# Patient Record
Sex: FEMALE | Race: WHITE | NOT HISPANIC OR LATINO | Employment: FULL TIME | URBAN - METROPOLITAN AREA
[De-identification: names, ages, dates, MRNs, and addresses within clinical notes are randomized per-mention and may not be internally consistent; named-entity substitution may affect disease eponyms.]

---

## 2023-08-10 ENCOUNTER — OFFICE VISIT (OUTPATIENT)
Dept: FAMILY MEDICINE CLINIC | Facility: CLINIC | Age: 58
End: 2023-08-10
Payer: COMMERCIAL

## 2023-08-10 VITALS
RESPIRATION RATE: 18 BRPM | HEART RATE: 78 BPM | WEIGHT: 191 LBS | DIASTOLIC BLOOD PRESSURE: 72 MMHG | TEMPERATURE: 97.8 F | SYSTOLIC BLOOD PRESSURE: 110 MMHG | BODY MASS INDEX: 27.35 KG/M2 | HEIGHT: 70 IN

## 2023-08-10 DIAGNOSIS — E55.9 VITAMIN D INSUFFICIENCY: ICD-10-CM

## 2023-08-10 DIAGNOSIS — E03.9 ACQUIRED HYPOTHYROIDISM: ICD-10-CM

## 2023-08-10 DIAGNOSIS — M79.622 LEFT UPPER ARM PAIN: ICD-10-CM

## 2023-08-10 DIAGNOSIS — R53.82 CHRONIC FATIGUE: ICD-10-CM

## 2023-08-10 DIAGNOSIS — Z00.00 ENCOUNTER FOR WELL ADULT EXAM WITHOUT ABNORMAL FINDINGS: Primary | ICD-10-CM

## 2023-08-10 DIAGNOSIS — Z13.6 ENCOUNTER FOR LIPID SCREENING FOR CARDIOVASCULAR DISEASE: ICD-10-CM

## 2023-08-10 DIAGNOSIS — Z11.59 NEED FOR HEPATITIS C SCREENING TEST: ICD-10-CM

## 2023-08-10 DIAGNOSIS — Z12.11 SCREENING FOR MALIGNANT NEOPLASM OF COLON: ICD-10-CM

## 2023-08-10 DIAGNOSIS — M79.604 RIGHT LEG PAIN: ICD-10-CM

## 2023-08-10 DIAGNOSIS — Z13.220 ENCOUNTER FOR LIPID SCREENING FOR CARDIOVASCULAR DISEASE: ICD-10-CM

## 2023-08-10 DIAGNOSIS — I83.813 VARICOSE VEINS OF BOTH LOWER EXTREMITIES WITH PAIN: ICD-10-CM

## 2023-08-10 RX ORDER — MULTIPLE VITAMINS W/ MINERALS TAB 9MG-400MCG
1 TAB ORAL DAILY
COMMUNITY

## 2023-08-10 NOTE — PROGRESS NOTES
Subjective   Rashmi Wan is a 58 y.o. female.     History of Present Illness     HPI  The patient presents today to [facility] care and for annual exam . The patient has not seen a primary care physician since 2019. Her previous primary care physician was Dr. Ghotra but he retired.    The patient has varicose veins. She has very little swelling except down by the ankles. The patient also experiences pain in the lateral aspect of the right thigh.    The patient has a back injury. She fell down a flight of steps 20 years ago and had 2 herniated discs. She sees a chiropractor, Dr. Meek, once a month for this who performs decompression on her back, which has helped up until this last year.     Occasionally, the patient feels like her right lower extremity is going to give out. She also has pain in her left upper extremity, especially when she lifts it up. Sometimes she cannot tell if the pain is from her varicose veins or the lower extremity itself. The patient's knee is slightly puffy. The patient fell on an escalator when 2 people came up behind her and they tripped and fell into her. She has a slight tear in the meniscus.    The patient has pain in the location of the right rib cage. She feels sharp pains when she bends over to tie her shoes.    The patient has hypothyroidism for which she was put on Synthroid. They kept having to increase the dose, and then she started to gain weight. She took Synthroid for about 2 years. She had a biopsy performed approximately 15 years ago in [place] when she had a thyroid nodule, and no malignancy was found.    The patient eats organic food and a gluten free diet. The patient tries to go to the gym as much as she can, but the pain in her right lower extremity slows her down.    The patient is concerned about her hormones. The patient went through menopause at age 54. She had COVID-19 in 04/2020, and her menstruation just stopped at that time. The patient had a  "transvaginal ultrasound a few years ago, and everything was normal. There were no fibroids, but she has had cysts in the past. She denies ever being on hormone replacement therapy.    The patient had a mammogram when she was 38 years old, and she does not want to have any more, as she is concerned about the exposure to the radiation. She has had a Colguard test with Dr. Ghotra when she was 50 or 51 years old which was normal.    From 10/2015 to 02/2015, the patient was living in an . There was a high amount of mold, and she is highly allergic to mold. She was continually coming down with sinus infections and upper respiratory infections. At that time, she went through 4 doses of antibiotics and steroids which resulted in a bleeding ulcer in her stomach which resulted in anemia.    The patient's brother had Hodgkin's disease at age 12. He is 52 years old now. She denies a family history of autoimmune issues.    The patient works at a desk job.    The following portions of the patient's history were reviewed and updated as appropriate: allergies, current medications, past family history, past medical history, past social history, past surgical history, and problem list.        Objective   Blood pressure 110/72, pulse 78, temperature 97.8 øF (36.6 øC), resp. rate 18, height 176.5 cm (69.5\"), weight 86.6 kg (191 lb).     Physical Exam  Vitals and nursing note reviewed.   Constitutional:       Appearance: Normal appearance.   HENT:      Head: Normocephalic.      Right Ear: Tympanic membrane, ear canal and external ear normal.      Left Ear: Tympanic membrane, ear canal and external ear normal.      Nose: Nose normal.   Cardiovascular:      Rate and Rhythm: Normal rate and regular rhythm.      Comments: Varicose veins of LE bilaterally   Pulmonary:      Effort: Pulmonary effort is normal.      Breath sounds: Normal breath sounds.   Skin:     General: Skin is warm and dry.   Neurological:      Mental Status: She is " alert and oriented to person, place, and time.   Psychiatric:         Mood and Affect: Mood normal.         Behavior: Behavior normal.       Assessment & Plan   Diagnoses and all orders for this visit:    1. Encounter for well adult exam without abnormal findings (Primary)  -     CBC w AUTO Differential  -     Comprehensive metabolic panel  -     Lipid Panel  -     TSH  -     T4, free  -     T3, free  -     Thyroid Antibodies  -     Hepatitis C antibody  -     Iron Profile  -     Vitamin B12  -     Folate  -     Vitamin D 25 hydroxy  -     FSH & LH  -     Progesterone  -     Estrogens, Total  -     MARICARMEN  -     Rheumatoid Factor  -     Sedimentation rate, automated  -     C-reactive protein  -     CK  -     Magnesium  -     Ambulatory Referral to Gynecology    2. Screening for malignant neoplasm of colon  -     Cologuard - Stool, Per Rectum; Future    3. Varicose veins of both lower extremities with pain  -     Ambulatory Referral to Vascular Surgery    4. Right leg pain  -     CBC w AUTO Differential  -     Comprehensive metabolic panel  -     MARICARMEN  -     Rheumatoid Factor  -     Sedimentation rate, automated  -     C-reactive protein  -     CK  -     Magnesium  -     Ambulatory Referral to Orthopedic Surgery    5. Left upper arm pain  -     MARICARMEN  -     Rheumatoid Factor  -     Sedimentation rate, automated  -     C-reactive protein  -     CK  -     Magnesium  -     Ambulatory Referral to Orthopedic Surgery    6. Vitamin D insufficiency  -     Vitamin D 25 hydroxy    7. Need for hepatitis C screening test  -     Hepatitis C antibody    8. Acquired hypothyroidism  -     CBC w AUTO Differential  -     Comprehensive metabolic panel  -     TSH  -     T4, free  -     T3, free  -     Thyroid Antibodies    9. Chronic fatigue  -     Iron Profile  -     Vitamin B12  -     Folate  -     MARICARMEN    10. Encounter for lipid screening for cardiovascular disease  -     Lipid Panel       Varicose veins  - I advised the patient to obtain  compression socks.  - I will refer the patient to a vascular specialist.    Right lower extremity and left upper extremity pain  - I will refer her to an orthopedist and check labs as noted      Health maintenance  - I will order a Cologuard kit for the patient.  - I will refer her to an OB/GYN.  - I will order labs.  - The patient declines any vaccinations.    Counseling was given to patient for the following topics: instructions for management, risk factor reductions, patient and family education, and impressions . Total time of the encounter was 30 minutes and 15 minutes was spent counseling.      Transcribed from ambient dictation for GIOVANNI Gamble by Salma Marie.  08/10/23   17:55 EDT    Patient or patient representative verbalized consent to the visit recording.  I have personally performed the services described in this document as transcribed by the above individual, and it is both accurate and complete.

## 2023-08-15 ENCOUNTER — TELEPHONE (OUTPATIENT)
Dept: FAMILY MEDICINE CLINIC | Facility: CLINIC | Age: 58
End: 2023-08-15
Payer: COMMERCIAL

## 2023-08-15 NOTE — TELEPHONE ENCOUNTER
"Caller: Rashmi Torres \"Patria\"    Relationship: Self    Best call back number:  [phone]    What orders are you requesting (i.e. lab or imaging): VENUS DUPLEX TEST    In what timeframe would the patient need to come in: ASAP    Where will you receive your lab/imaging services: [facility]    Additional notes: PATIENT STATES THAT SHE NEEDS THE TEST DONE BEFORE SHE CAN BE REFERRED        "

## 2023-08-16 ENCOUNTER — PATIENT ROUNDING (BHMG ONLY) (OUTPATIENT)
Dept: FAMILY MEDICINE CLINIC | Facility: CLINIC | Age: 58
End: 2023-08-16
Payer: COMMERCIAL

## 2023-08-16 NOTE — PROGRESS NOTES
A My-Chart message has been sent to the patient for PATIENT ROUNDING with [facility].

## 2023-08-17 ENCOUNTER — TELEPHONE (OUTPATIENT)
Dept: FAMILY MEDICINE CLINIC | Facility: CLINIC | Age: 58
End: 2023-08-17

## 2023-08-17 ENCOUNTER — TELEPHONE (OUTPATIENT)
Dept: FAMILY MEDICINE CLINIC | Facility: CLINIC | Age: 58
End: 2023-08-17
Payer: COMMERCIAL

## 2023-08-17 LAB
25(OH)D3+25(OH)D2 SERPL-MCNC: 34 NG/ML (ref 30–100)
ALBUMIN SERPL-MCNC: 4.6 G/DL (ref 3.8–4.9)
ALBUMIN/GLOB SERPL: 1.8 {RATIO} (ref 1.2–2.2)
ALP SERPL-CCNC: 62 IU/L (ref 44–121)
ALT SERPL-CCNC: 13 IU/L (ref 0–32)
ANA SER QL: NEGATIVE
AST SERPL-CCNC: 17 IU/L (ref 0–40)
BASOPHILS # BLD AUTO: 0 X10E3/UL (ref 0–0.2)
BASOPHILS NFR BLD AUTO: 1 %
BILIRUB SERPL-MCNC: 0.6 MG/DL (ref 0–1.2)
BUN SERPL-MCNC: 11 MG/DL (ref 8–27)
BUN/CREAT SERPL: 17 (ref 12–28)
CALCIUM SERPL-MCNC: 9.6 MG/DL (ref 8.7–10.3)
CHLORIDE SERPL-SCNC: 103 MMOL/L (ref 96–106)
CHOLEST SERPL-MCNC: 236 MG/DL (ref 100–199)
CK SERPL-CCNC: 82 U/L (ref 32–182)
CO2 SERPL-SCNC: 24 MMOL/L (ref 20–29)
CREAT SERPL-MCNC: 0.66 MG/DL (ref 0.57–1)
CRP SERPL-MCNC: <1 MG/L (ref 0–10)
EGFRCR SERPLBLD CKD-EPI 2021: 100 ML/MIN/1.73
EOSINOPHIL # BLD AUTO: 0.1 X10E3/UL (ref 0–0.4)
EOSINOPHIL NFR BLD AUTO: 1 %
ERYTHROCYTE [DISTWIDTH] IN BLOOD BY AUTOMATED COUNT: 13 % (ref 11.7–15.4)
ERYTHROCYTE [SEDIMENTATION RATE] IN BLOOD BY WESTERGREN METHOD: 8 MM/HR (ref 0–40)
ESTROGEN SERPL-MCNC: 52 PG/ML (ref 40–244)
FOLATE SERPL-MCNC: 8.9 NG/ML
FSH SERPL-ACNC: 85.8 MIU/ML
GLOBULIN SER CALC-MCNC: 2.5 G/DL (ref 1.5–4.5)
GLUCOSE SERPL-MCNC: 95 MG/DL (ref 70–99)
HCT VFR BLD AUTO: 42.9 % (ref 34–46.6)
HCV IGG SERPL QL IA: NON REACTIVE
HDLC SERPL-MCNC: 74 MG/DL
HGB BLD-MCNC: 14 G/DL (ref 11.1–15.9)
IMM GRANULOCYTES # BLD AUTO: 0 X10E3/UL (ref 0–0.1)
IMM GRANULOCYTES NFR BLD AUTO: 0 %
IRON SATN MFR SERPL: 29 % (ref 15–55)
IRON SERPL-MCNC: 92 UG/DL (ref 27–159)
LDLC SERPL CALC-MCNC: 143 MG/DL (ref 0–99)
LH SERPL-ACNC: 49 MIU/ML
LYMPHOCYTES # BLD AUTO: 2 X10E3/UL (ref 0.7–3.1)
LYMPHOCYTES NFR BLD AUTO: 30 %
MAGNESIUM SERPL-MCNC: 2.3 MG/DL (ref 1.6–2.3)
MCH RBC QN AUTO: 29.3 PG (ref 26.6–33)
MCHC RBC AUTO-ENTMCNC: 32.6 G/DL (ref 31.5–35.7)
MCV RBC AUTO: 90 FL (ref 79–97)
MONOCYTES # BLD AUTO: 0.6 X10E3/UL (ref 0.1–0.9)
MONOCYTES NFR BLD AUTO: 9 %
NEUTROPHILS # BLD AUTO: 3.9 X10E3/UL (ref 1.4–7)
NEUTROPHILS NFR BLD AUTO: 59 %
PLATELET # BLD AUTO: 279 X10E3/UL (ref 150–450)
POTASSIUM SERPL-SCNC: 3.9 MMOL/L (ref 3.5–5.2)
PROGEST SERPL-MCNC: 0.1 NG/ML
PROT SERPL-MCNC: 7.1 G/DL (ref 6–8.5)
RBC # BLD AUTO: 4.78 X10E6/UL (ref 3.77–5.28)
RHEUMATOID FACT SERPL-ACNC: <10 IU/ML
SODIUM SERPL-SCNC: 141 MMOL/L (ref 134–144)
T3FREE SERPL-MCNC: 3.3 PG/ML (ref 2–4.4)
T4 FREE SERPL-MCNC: 1 NG/DL (ref 0.82–1.77)
THYROGLOB AB SERPL-ACNC: 1 IU/ML (ref 0–0.9)
THYROPEROXIDASE AB SERPL-ACNC: 110 IU/ML (ref 0–34)
TIBC SERPL-MCNC: 320 UG/DL (ref 250–450)
TRIGL SERPL-MCNC: 109 MG/DL (ref 0–149)
TSH SERPL DL<=0.005 MIU/L-ACNC: 3.04 UIU/ML (ref 0.45–4.5)
UIBC SERPL-MCNC: 228 UG/DL (ref 131–425)
VIT B12 SERPL-MCNC: 956 PG/ML (ref 232–1245)
VLDLC SERPL CALC-MCNC: 19 MG/DL (ref 5–40)
WBC # BLD AUTO: 6.6 X10E3/UL (ref 3.4–10.8)

## 2023-08-17 NOTE — TELEPHONE ENCOUNTER
"Caller: Rashmi Torres \"Patria\"    Relationship to patient: Self    Best call back number: [phone]     PATIENT IS CALLING STATING THAT SHE IS TO HAVE AN VEIN TEST SCHEDULED, BUT SHE HAS NOT RECEIVE ANY INFORMATION.  "

## 2023-08-17 NOTE — TELEPHONE ENCOUNTER
Pt advised referral was processed 8/15/2023 and may take a few days before someone will call with a date and time for the appt

## 2023-08-17 NOTE — TELEPHONE ENCOUNTER
"  Caller: Rashmi Torres \"Patria\"    Relationship: Self    Best call back number: [phone]     Caller requesting test results: PATIENT    What test was performed: LABS    When was the test performed: LAST THURSDAY    Where was the test performed: [facility]  "

## 2023-08-22 ENCOUNTER — TELEPHONE (OUTPATIENT)
Dept: FAMILY MEDICINE CLINIC | Facility: CLINIC | Age: 58
End: 2023-08-22
Payer: COMMERCIAL

## 2023-08-24 ENCOUNTER — TELEPHONE (OUTPATIENT)
Dept: FAMILY MEDICINE CLINIC | Facility: CLINIC | Age: 58
End: 2023-08-24
Payer: COMMERCIAL

## 2023-08-24 NOTE — TELEPHONE ENCOUNTER
Pt believes it's bed bugs. Lenny has left for the day and isn't in the office tmrw. So, I suggested OTC products, scheduling appt, going to [facility] etc. Declined appt due to copay charge. She did express her concerns to the Hotel in [place]. The  is suppose to get back w/ her on their findings.

## 2023-09-06 ENCOUNTER — TELEPHONE (OUTPATIENT)
Dept: FAMILY MEDICINE CLINIC | Facility: CLINIC | Age: 58
End: 2023-09-06
Payer: COMMERCIAL

## 2023-09-06 NOTE — TELEPHONE ENCOUNTER
CALLED [facility] THEY ARE WORKING ON ORDERS - I WENT AHEAD AN SENT ORDER TO Jew TO GET APPOINTMENT SOONER - I CALLED JESSICA TO MAKE AWARE

## 2023-09-06 NOTE — TELEPHONE ENCOUNTER
PATIENT HAS NOT HEARD ANYTHING ABOUT SCHEDULING VENOUS DUPLEX.  WHAT IS GOING ON?    PLEASE CALL [phone]

## 2023-09-12 ENCOUNTER — OFFICE VISIT (OUTPATIENT)
Dept: ORTHOPEDIC SURGERY | Facility: CLINIC | Age: 58
End: 2023-09-12
Payer: COMMERCIAL

## 2023-09-12 VITALS
BODY MASS INDEX: 28.29 KG/M2 | HEIGHT: 69 IN | DIASTOLIC BLOOD PRESSURE: 74 MMHG | SYSTOLIC BLOOD PRESSURE: 106 MMHG | WEIGHT: 191 LBS

## 2023-09-12 DIAGNOSIS — M76.31 IT BAND SYNDROME, RIGHT: ICD-10-CM

## 2023-09-12 DIAGNOSIS — M76.891 ADDUCTOR TENDINITIS OF RIGHT HIP: ICD-10-CM

## 2023-09-12 DIAGNOSIS — M17.11 PRIMARY OSTEOARTHRITIS OF RIGHT KNEE: ICD-10-CM

## 2023-09-12 DIAGNOSIS — M25.561 RIGHT KNEE PAIN, UNSPECIFIED CHRONICITY: Primary | ICD-10-CM

## 2023-09-12 NOTE — PROGRESS NOTES
[facility] [place]     Office Visit       Date: 09/12/2023   Patient Name: Rashmi Wan  MRN: [id number]  YOB: 1965    Referring Physician: Lenny Antonio PA     Chief Complaint:   Chief Complaint   Patient presents with    Right Leg - Pain, Initial Evaluation     Onset 2018-Lateral Distal Thigh/Anterior Thigh     History of Present Illness:   Rashmi Wan is a 58 y.o. female who presents with right knee/thigh pain for 5 year(s). Onset atraumatic and gradual in nature. Pain is localized to the lateral joint line, anterior knee, anterior thigh and is a 7/10 on the pain scale. Pain is described as  Cramp . Associated symptoms include giving way/buckling. The pain is worse with walking, climbing stairs, and rising from seated position; pain medication and/or NSAID and TENS, Topical Analgesic Cream makes it better. Previous treatments have included: Chiropractic treatment, TENS, OTC Pain-Topical Pain Cream since symptom onset. Although some transient relief was reported with these interventions, these conservative measures have failed and symptoms have persisted. The patient is limited in daily activities and has had a significant decrease in quality of life as a result. She denies fevers, chills, or constitutional symptoms. Patient is not a smoker.    Subjective   Review of Systems: Review of Systems   Constitutional:  Negative for chills, fever, unexpected weight gain and unexpected weight loss.   HENT:  Negative for congestion, postnasal drip and rhinorrhea.    Eyes:  Negative for blurred vision.   Respiratory:  Negative for shortness of breath.    Cardiovascular:  Negative for leg swelling.   Gastrointestinal:  Negative for abdominal pain, nausea and vomiting.   Genitourinary:  Negative for difficulty urinating.   Musculoskeletal:  Positive for arthralgias. Negative for gait problem, joint swelling and myalgias.   Skin:   Negative for skin lesions and wound.   Neurological:  Negative for dizziness, weakness, light-headedness and numbness.   Hematological:  Does not bruise/bleed easily.   Psychiatric/Behavioral:  Negative for depressed mood.    All other systems reviewed and are negative.     I have reviewed the following portions of the patient's history:History of Present Illness and review of systems.    Past Medical History:   Past Medical History:   Diagnosis Date    Allergic     Dust, Mold, Grasses    Anemia 2016    From steriod/antibiotics causing bleeding lesions in my stomach    Ankle sprain ,     Pulled ligaments    Bursitis of hip     Headache     Related to sinuses    Hypothyroidism     Take Thyriod Assist supplement    Low back pain     Fell down steps    Low back strain     Fell down commercial steps    Lumbosacral disc disease 2016    Two herniated discs L5, L6    Visual impairment 2018    See fuzzy lines, blurry       Past Surgical History: No past surgical history on file.    Family History:   Family History   Problem Relation Age of Onset    Cancer Mother         Colon Cancer - due to not being treated properly during -19    Thyroid disease Mother     Anesthesia problems Mother         Took one week to recover    Heart disease Father         Stents in heart    Cancer Brother         Hodgkin's Disease    Thyroid disease Brother         Due to radiation treatment    Anesthesia problems Paternal Grandmother         Didn’t recover;        Social History:   Social History     Socioeconomic History    Marital status:    Tobacco Use    Smoking status: Never     Passive exposure: Never    Smokeless tobacco: Never   Substance and Sexual Activity    Alcohol use: Yes     Alcohol/week: 3.0 standard drinks     Types: 3 Drinks containing 0.5 oz of alcohol per week     Comment: Social    Drug use: Never    Sexual activity: Yes     Partners: Male     Birth control/protection: Other, None  "    Comment: Menopause       Medications:   Current Outpatient Medications:     multivitamin with minerals tablet tablet, Take 1 tablet by mouth Daily., Disp: , Rfl:     NON FORMULARY, Thyroid Assist, Disp: , Rfl:     Allergies:   Allergies   Allergen Reactions    Other Other (See Comments)     Steroids cause bleeding lesions in stomach per patient     Augmentin [Amoxicillin-Pot Clavulanate] Other (See Comments)     Colitis        I reviewed the patient's chief complaint, history of present illness, review of systems, past medical history, surgical history, family history, social history, medications and allergy list.     Objective    Vital Signs:   Vitals:    09/12/23 1136   BP: 106/74   Weight: 86.6 kg (191 lb)   Height: 176.5 cm (69.49\")     Body mass index is 27.81 kg/m².   BMI is >= 25 and <30. (Overweight) The following options were offered after discussion;: exercise counseling/recommendations and nutrition counseling/recommendations     Patient reports that she is a non-smoker and has not ever been a smoker.  This behavior was applauded and she was encouraged to continue in smoking cessation.  We will continue to monitor at subsequent visits.    Ortho Exam:  Right knee: No erythema, ecchymosis, swelling.  Tender to palpation over the lateral knee and through the more proximal IT band.  Tender through the distal adductors.  Full range of motion in flexion extension but pain is experienced with deep knee flexion.  She can get to 0 degrees extension, 130 degrees in flexion.  Stable to varus and valgus stress.  Negative anterior posterior drawer.  Negative Pepper's.  Sensation intact to light touch.  5/5 strength.  2+ posterior tibial pulse.  Positive Vickie test.    Results Review:   Imaging Results (Last 24 Hours)       Procedure Component Value Units Date/Time    XR Knee 4+ View Right [[id number]] Resulted: 09/12/23 1139     Updated: 09/12/23 1152        I personally reviewed the radiographs of the right knee. "  No acute fracture or dislocation.  There is evidence of degenerative changes including tricompartmental space narrowing.    Procedures    Assessment / Plan    Assessment/Plan:   Diagnoses and all orders for this visit:    1. Right knee pain, unspecified chronicity (Primary)  -     XR Knee 4+ View Right    2. Primary osteoarthritis of right knee    3. It band syndrome, right  -     Ambulatory Referral to Physical Therapy Evaluate and treat, Ortho; Electrotherapy; Tens (Home), Iontophoresis, E-stim; Cross Fiber, Soft Tissue Mobilizaton, Desensitization; Stretching, ROM, Strengthening    4. Adductor tendinitis of right hip  -     Ambulatory Referral to Physical Therapy Evaluate and treat, Ortho; Electrotherapy; Tens (Home), Iontophoresis, E-stim; Cross Fiber, Soft Tissue Mobilizaton, Desensitization; Stretching, ROM, Strengthening      Multiple years of worsening low back and right lateral hip pain.  She has previously MRI confirmed herniated disks at multiple levels in her low back.  She has been through chiropractic care for this.  Now, she is having more significant pain and cramping through both the lateral and medial thigh.  She is tender through the IT band and has a positive Vickie test on exam.  She also has pain through the adductors.  She has been taking topical medications, Bentley back and body as well as using a TENS unit.  Radiographs of her knee today show degenerative changes consistent with primary knee osteoarthritis in all joint compartments.  However, her presenting complaint is more consistent with IT band syndrome and adductor tendinitis.  I recommend that she continue with her topicals as well as the TENS unit.  She would most benefit from physical therapy and I arranged for this today.  If she does not make any significant improvement with these modalities, I would look more aggressively at her lumbar spine given her history.  I will see her back in 5 weeks to make this determination.    Previous  laboratory results reviewed: 8/10/2023-CRP less than 1.  ESR 8.  Rheumatoid factor less than 10.0.  MARICARMEN negative.    Follow Up:   Return in about 5 weeks (around 10/17/2023) for Recheck.      Reece Mckeon MD  [facility]

## 2023-10-25 ENCOUNTER — OFFICE VISIT (OUTPATIENT)
Dept: FAMILY MEDICINE CLINIC | Facility: CLINIC | Age: 58
End: 2023-10-25
Payer: COMMERCIAL

## 2023-10-25 ENCOUNTER — HOSPITAL ENCOUNTER (OUTPATIENT)
Dept: GENERAL RADIOLOGY | Facility: HOSPITAL | Age: 58
Discharge: HOME | End: 2023-10-25
Admitting: NURSE PRACTITIONER
Payer: COMMERCIAL

## 2023-10-25 VITALS
BODY MASS INDEX: 28.14 KG/M2 | RESPIRATION RATE: 14 BRPM | HEIGHT: 69 IN | OXYGEN SATURATION: 98 % | TEMPERATURE: 97.8 F | SYSTOLIC BLOOD PRESSURE: 116 MMHG | WEIGHT: 190 LBS | HEART RATE: 80 BPM | DIASTOLIC BLOOD PRESSURE: 72 MMHG
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
## 2023-10-25 DIAGNOSIS — R51.9 OCCIPITAL PAIN: ICD-10-CM
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
## 2023-10-25 PROCEDURE — 70250 X-RAY EXAM OF SKULL: CPT

## 2023-10-25 NOTE — PROGRESS NOTES
Date: 10/25/2023   Patient Name: Rashmi Buck  : 1965   MRN: [id number]     Chief Complaint:    Chief Complaint   Patient presents with    Fell-hit head     Hit back of head on Sat. Very sore.        History of Present Illness: Rashmi Buck is a 58 y.o. female who is here today for Fall.  Patient reports that her friend went to give her a hug by and tripped and postero from [facility] and she fell and hit her head on the foot wrist.  She hit the occipital region of her skull.  Denies any loss of consciousness, headache, visual disturbances, nausea, vomiting.  She does report that when it happened she felt an indentation but now it is just tender to touch.  She has been sleeping well.  She did not go to the ER at the time.      Review of Systems:   Review of Systems   Constitutional:  Negative for activity change and fatigue.   Respiratory:  Negative for shortness of breath.    Cardiovascular:  Negative for chest pain.   Gastrointestinal:  Negative for nausea and vomiting.   Genitourinary:  Negative for difficulty urinating.   Musculoskeletal:  Negative for back pain and gait problem.   Skin:  Positive for bruise (Occipital region of skull).       Past Medical History:   Past Medical History:   Diagnosis Date    Allergic     Dust, Mold, Grasses    Anemia 2016    From steriod/antibiotics causing bleeding lesions in my stomach    Ankle sprain , 2015    Pulled ligaments    Bursitis of hip 2018    Headache     Related to sinuses    Hypothyroidism     Take Thyriod Assist supplement    Low back pain     Fell down steps    Low back strain     Fell down commercial steps    Lumbosacral disc disease 2016    Two herniated discs L5, L6    Visual impairment 2018    See fuzzy lines, blurry       Past Surgical History: History reviewed. No pertinent surgical history.    Family History:   Family History   Problem Relation Age of Onset    Cancer Mother         Colon Cancer -  "due to not being treated properly during -19    Thyroid disease Mother     Anesthesia problems Mother         Took one week to recover    Heart disease Father         Stents in heart    Cancer Brother         Hodgkin's Disease    Thyroid disease Brother         Due to radiation treatment    Anesthesia problems Paternal Grandmother         Didn’t recover;        Social History:   Social History     Socioeconomic History    Marital status:    Tobacco Use    Smoking status: Never     Passive exposure: Never    Smokeless tobacco: Never   Substance and Sexual Activity    Alcohol use: Yes     Alcohol/week: 3.0 standard drinks of alcohol     Types: 3 Drinks containing 0.5 oz of alcohol per week     Comment: Social    Drug use: Never    Sexual activity: Yes     Partners: Male     Birth control/protection: Other, None     Comment: Menopause       Medications:     Current Outpatient Medications:     multivitamin with minerals tablet tablet, Take 1 tablet by mouth Daily., Disp: , Rfl:     NON FORMULARY, Thyroid Assist, Disp: , Rfl:     Allergies:   Allergies   Allergen Reactions    Other Other (See Comments)     Steroids cause bleeding lesions in stomach per patient     Augmentin [Amoxicillin-Pot Clavulanate] Other (See Comments)     Colitis          Physical Exam:  Vital Signs:   Vitals:    10/25/23 1501   BP: 116/72   Pulse: 80   Resp: 14   Temp: 97.8 °F (36.6 °C)   SpO2: 98%   Weight: 86.2 kg (190 lb)   Height: 176.5 cm (69.49\")     Body mass index is 27.66 kg/m².     Physical Exam  Vitals and nursing note reviewed.   Constitutional:       Appearance: Normal appearance.   HENT:      Head: Normocephalic and atraumatic.   Cardiovascular:      Rate and Rhythm: Normal rate and regular rhythm.   Pulmonary:      Effort: Pulmonary effort is normal.      Breath sounds: Normal breath sounds.   Abdominal:      General: Bowel sounds are normal.   Musculoskeletal:      Cervical back: Normal.      Thoracic back: Normal.     "  Lumbar back: No tenderness. Normal range of motion.   Skin:     General: Skin is warm.      Findings: Bruising (Occipital of skull) present.   Neurological:      General: No focal deficit present.      Mental Status: She is alert and oriented to person, place, and time.      GCS: GCS eye subscore is 4. GCS verbal subscore is 5. GCS motor subscore is 6.      Motor: Motor function is intact.      Coordination: Coordination is intact.      Gait: Gait is intact.   Psychiatric:         Mood and Affect: Mood normal.           Assessment/Plan:   Diagnoses and all orders for this visit:    1. Fall, initial encounter (Primary)  -     XR skull lateral and ap; Future    2. Occipital pain  -     XR skull lateral and ap; Future       Tylenol as needed  Monitor for worsening symptoms  Go to the ER for headache 10 out of 10, visual disturbances.    Follow Up:   Return if symptoms worsen or fail to improve.    Missy Enamorado. LEE   [facility]   16:00 EDT 10/25/2023

## 2023-10-26 ENCOUNTER — TELEPHONE (OUTPATIENT)
Dept: FAMILY MEDICINE CLINIC | Facility: CLINIC | Age: 58
End: 2023-10-26
Payer: COMMERCIAL

## 2023-10-26 DIAGNOSIS — G44.311 INTRACTABLE ACUTE POST-TRAUMATIC HEADACHE: ICD-10-CM
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
## 2023-10-26 NOTE — TELEPHONE ENCOUNTER
"  Caller: Rashmi Buck \"Patria\"    Relationship: Self    Best call back number: [phone]     Caller requesting test results: YES    What test was performed: XR Skull Lateral & Ap     When was the test performed: 10/25    Where was the test performed:     [facility] [place]       Additional notes: PATIENT STATED SHE COULD CALL TODAY TO GET HER RESULTS.          "

## 2023-10-27 NOTE — TELEPHONE ENCOUNTER
Yesterday pt was in a 'fog' She is requesting a CT. Stated she fell asleep 3x yesterday which isn't like her.     Spoke w/ Missy, suggested to go to ER. Pt informed and declined due to cost. Requesting you place the CT order please.

## 2023-10-30 ENCOUNTER — TELEPHONE (OUTPATIENT)
Dept: FAMILY MEDICINE CLINIC | Facility: CLINIC | Age: 58
End: 2023-10-30
Payer: COMMERCIAL

## 2023-10-30 ENCOUNTER — HOSPITAL ENCOUNTER (OUTPATIENT)
Dept: CT IMAGING | Facility: HOSPITAL | Age: 58
Discharge: HOME | End: 2023-10-30
Admitting: NURSE PRACTITIONER
Payer: COMMERCIAL
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
## 2023-10-30 DIAGNOSIS — G44.311 INTRACTABLE ACUTE POST-TRAUMATIC HEADACHE: ICD-10-CM

## 2023-10-30 PROCEDURE — 70450 CT HEAD/BRAIN W/O DYE: CPT

## 2023-10-30 NOTE — TELEPHONE ENCOUNTER
"  Caller: Rashmi Buck \"Patria\"    Relationship: Self    Best call back number: [phone]     What is the best time to reach you: ANY    Who are you requesting to speak with (clinical staff, provider,  specific staff member): SAADIA GEE/ SHONNA OLVERA OR HER NURSE    Do you know the name of the person who called: NO    What was the call regarding: THE PATIENT MISSED A CALL AND THINKS IT IS ABOUT THE HEAD CT SHE JUST HAD. PLEASE LET HER KNOW. CALL HER BACK ASAP.     Is it okay if the provider responds through MyChart: NO  "

## 2023-10-31 ENCOUNTER — OFFICE VISIT (OUTPATIENT)
Dept: ORTHOPEDIC SURGERY | Facility: CLINIC | Age: 58
End: 2023-10-31
Payer: COMMERCIAL

## 2023-10-31 VITALS
BODY MASS INDEX: 28.14 KG/M2 | SYSTOLIC BLOOD PRESSURE: 110 MMHG | WEIGHT: 190 LBS | HEIGHT: 69 IN | DIASTOLIC BLOOD PRESSURE: 84 MMHG

## 2023-10-31 DIAGNOSIS — M76.01 GLUTEAL TENDINITIS OF RIGHT BUTTOCK: Primary | ICD-10-CM

## 2023-10-31 NOTE — PROGRESS NOTES
[facility] Follow Up Visit     Office Follow Up      Date: 10/31/2023   Patient Name: Rashmi Buck  MRN: [id number]  YOB: 1965    Referring Physician: No ref. provider found     Chief Complaint:   Chief Complaint   Patient presents with    Follow-up     7 week f/u;  Primary osteoarthritis of right knee     History of Present Illness: Rashmi Buck is a 58 y.o. female who is here today for follow up on right hip and knee pain.  Since last visit, she has been in physical therapy.  She has made improvement in her symptoms though her pain is still 7/10.  Pain more in the proximal lateral hip today is less than 2 the distal IT band.  Not having significant medial thigh pain.  No anterior hip or groin pain.    Subjective   Review of Systems: Review of Systems   Constitutional:  Negative for chills, fever, unexpected weight gain and unexpected weight loss.   HENT:  Negative for congestion, postnasal drip and rhinorrhea.    Eyes:  Negative for blurred vision.   Respiratory:  Negative for shortness of breath.    Cardiovascular:  Negative for leg swelling.   Gastrointestinal:  Negative for abdominal pain, nausea and vomiting.   Genitourinary:  Negative for difficulty urinating.   Musculoskeletal:  Positive for arthralgias. Negative for gait problem, joint swelling and myalgias.   Skin:  Negative for skin lesions and wound.   Neurological:  Negative for dizziness, weakness, light-headedness and numbness.   Hematological:  Does not bruise/bleed easily.   Psychiatric/Behavioral:  Negative for depressed mood.         Medications:   Current Outpatient Medications:     multivitamin with minerals tablet tablet, Take 1 tablet by mouth Daily., Disp: , Rfl:     NON FORMULARY, Thyroid Assist, Disp: , Rfl:     Allergies:   Allergies   Allergen Reactions    Other Other (See Comments)     Steroids cause bleeding lesions in stomach per  "patient     Augmentin [Amoxicillin-Pot Clavulanate] Other (See Comments)     Colitis        I have reviewed and updated the patient's chief complaint, history of present illness, review of systems, past medical history, surgical history, family history, social history, medications and allergy list as appropriate.     Objective    Vital Signs:   Vitals:    10/31/23 1316   BP: 110/84   Weight: 86.2 kg (190 lb)   Height: 176.5 cm (69.49\")     Body mass index is 27.67 kg/m².         Ortho Exam:  Hip/Pelvis Appearance: Inspection: normal axial alignment and pelvis level.   Hips: Bony Palpation Right: tenderness of the greater trochanter. Bony Palpation Left: no tenderness of the iliac crest, the ASIS, the PSIS, the pubic tubercle, the sciatic notch, the ischial tuberosity, the SI joint, or the greater trochanter. Passive Range of Motion Right: no flexion contracture, hamstring tightness popliteal angle, or pain with motion and normal, flexion normal, extension normal, internal rotation normal, and external rotation normal. Passive Range of Motion Left: no flexion contracture, hamstring tightness popliteal angle, or pain with motion and normal, flexion normal, extension normal, internal rotation normal, and external rotation normal. Strength Right: normal 5/5. Strength Left: normal 5/5.   Skin: Right Lower Extremity: normal. Left Lower Extremity: normal.   right hip exam:   Normal hip contour without evidence of ecchymosis or swelling.   Range of motion of the hip shows pain only is exacerbated with Shaji test, Straight Leg Raise.   Negative log roll, FADIR.   Hip flexion 110°, internal rotation 10°, external rotation 60°.   Tenderness to palpation greatest at the greater trochanter region.   Mild tenderness along the iliotibial band.   Mild tenderness at the gluteal region.   Negative tenderness at the ischial tuberosity.   Negative SI joint tenderness to palpation.  left hip exam:   Normal hip contour without evidence " of swelling or echymosis.   Full range of motion without exacerbation of pain.   Negative tenderness to palpation throughout.    Results Review:   Imaging Results (Last 24 Hours)       ** No results found for the last 24 hours. **            Procedures    Assessment / Plan    Assessment/Plan:   Diagnoses and all orders for this visit:    1. Gluteal tendinitis of right buttock (Primary)  -     Ambulatory Referral to Physical Therapy Evaluate and treat, Ortho; Electrotherapy; Tens (Home), Iontophoresis, E-stim; Cross Fiber, Desensitization, Soft Tissue Mobilizaton; Stretching, ROM, Strengthening; Right; Full weight bearing    Follow-up on right hip pain.  We have been treating her for IT band syndrome with physical therapy.  She has made some improvements especially in the distal lateral thigh.  She is less tender today around her knee.  The abductor tendinitis has resolved as well.  She still has some strength deficits with hip flexion.  She also has motion difficulty especially in external rotation.  However, most of her pain today is at the greater trochanter and through her gluteal muscles.  I discussed the diagnosis of gluteal tendinitis with her.  I have recommended a course of nonoperative treatment with stretches and refocused physical therapy.  I provided her with a new referral today.  Another option would include ultrasound-guided corticosteroid injection into the bursa of the hip but she does not desire that today given prior bad reactions to corticosteroids.  Therefore, I will give her the new referral, have her monitor her symptoms, and follow-up if not improved in 1 to 2 months.    Follow Up:   Return if symptoms worsen or fail to improve.      Reece Mckeon MD  [facility]

## 2023-12-27 ENCOUNTER — OFFICE VISIT (OUTPATIENT)
Dept: FAMILY MEDICINE CLINIC | Facility: CLINIC | Age: 58
End: 2023-12-27
Payer: COMMERCIAL

## 2023-12-27 VITALS
OXYGEN SATURATION: 99 % | BODY MASS INDEX: 28.88 KG/M2 | SYSTOLIC BLOOD PRESSURE: 116 MMHG | TEMPERATURE: 97.5 F | RESPIRATION RATE: 14 BRPM | HEIGHT: 69 IN | DIASTOLIC BLOOD PRESSURE: 72 MMHG | WEIGHT: 195 LBS | HEART RATE: 82 BPM

## 2023-12-27 DIAGNOSIS — M54.16 LUMBAR BACK PAIN WITH RADICULOPATHY AFFECTING RIGHT LOWER EXTREMITY: Primary | ICD-10-CM

## 2023-12-27 DIAGNOSIS — G89.29 CHRONIC RIGHT HIP PAIN: ICD-10-CM

## 2023-12-27 DIAGNOSIS — R29.898 RIGHT LEG WEAKNESS: ICD-10-CM

## 2023-12-27 DIAGNOSIS — M79.604 RIGHT LEG PAIN: ICD-10-CM

## 2023-12-27 DIAGNOSIS — M25.551 CHRONIC RIGHT HIP PAIN: ICD-10-CM

## 2023-12-27 DIAGNOSIS — J01.00 ACUTE MAXILLARY SINUSITIS, RECURRENCE NOT SPECIFIED: ICD-10-CM

## 2023-12-27 PROCEDURE — 99214 OFFICE O/P EST MOD 30 MIN: CPT | Performed by: PHYSICIAN ASSISTANT

## 2023-12-27 RX ORDER — CEFDINIR 300 MG/1
300 CAPSULE ORAL 2 TIMES DAILY
Qty: 14 CAPSULE | Refills: 0 | Status: SHIPPED | OUTPATIENT
Start: 2023-12-27

## 2023-12-27 NOTE — PROGRESS NOTES
"Subjective   Rashmi Buck is a 58 y.o. female.     History of Present Illness   Ongoing right leg pain and now worsening weakness   Feels unstable when walking like it will buckle   Sharp pains that shoot down leg   Hx of herniated disc per XR  No known hx of MRI   PT with minimal relief of symptoms. Told to follow back up with PCP     Some limited mobility of hip     1994 fall at work   Turning while falling and told spine was slightly twisted following this injury   No fractures     Pt also notes sinus congestion, pressure, drainage and ear pain for the past several days   No fever or chills     The following portions of the patient's history were reviewed and updated as appropriate: allergies, current medications, past family history, past medical history, past social history, past surgical history, and problem list.    Review of Systems  As noted per HPI     Objective   Blood pressure 116/72, pulse 82, temperature 97.5 °F (36.4 °C), resp. rate 14, height 176.5 cm (69.49\"), weight 88.5 kg (195 lb), SpO2 99%.   Physical Exam  Constitutional:       Appearance: Normal appearance.   HENT:      Head: Normocephalic.      Right Ear: Tympanic membrane, ear canal and external ear normal.      Left Ear: Tympanic membrane, ear canal and external ear normal.      Nose: Congestion present.      Mouth/Throat:      Pharynx: No posterior oropharyngeal erythema.   Cardiovascular:      Rate and Rhythm: Normal rate.   Pulmonary:      Effort: Pulmonary effort is normal.   Musculoskeletal:      Lumbar back: Tenderness present. Decreased range of motion.      Right hip: Tenderness and bony tenderness present. Decreased range of motion.   Neurological:      Mental Status: She is alert and oriented to person, place, and time.   Psychiatric:         Mood and Affect: Mood normal.         Behavior: Behavior normal.         Assessment & Plan   Diagnoses and all orders for this visit:    1. Lumbar back pain with radiculopathy " affecting right lower extremity (Primary)  -     MRI Lumbar Spine With & Without Contrast; Future    2. Chronic right hip pain  -     XR Hip With or Without Pelvis 2 - 3 View Right    3. Right leg pain  -     MRI Lumbar Spine With & Without Contrast; Future    4. Right leg weakness  -     MRI Lumbar Spine With & Without Contrast; Future    5. Acute maxillary sinusitis, recurrence not specified  -     cefdinir (OMNICEF) 300 MG capsule; Take 1 capsule by mouth 2 (Two) Times a Day.  Dispense: 14 capsule; Refill: 0      Proceed with MRI of low back and XR of right hip for ongoing symptoms     Cefdinir for sinusitis. F/u INB

## 2023-12-29 ENCOUNTER — HOSPITAL ENCOUNTER (OUTPATIENT)
Dept: GENERAL RADIOLOGY | Facility: HOSPITAL | Age: 58
Discharge: HOME | End: 2023-12-29
Admitting: PHYSICIAN ASSISTANT
Payer: COMMERCIAL

## 2023-12-29 PROCEDURE — 73502 X-RAY EXAM HIP UNI 2-3 VIEWS: CPT

## 2024-01-02 DIAGNOSIS — M16.11 OSTEOARTHRITIS OF RIGHT HIP, UNSPECIFIED OSTEOARTHRITIS TYPE: ICD-10-CM

## 2024-01-02 DIAGNOSIS — G89.29 CHRONIC RIGHT HIP PAIN: Primary | ICD-10-CM

## 2024-01-02 DIAGNOSIS — M25.551 CHRONIC RIGHT HIP PAIN: Primary | ICD-10-CM

## 2024-02-01 ENCOUNTER — OFFICE VISIT (OUTPATIENT)
Dept: OBSTETRICS AND GYNECOLOGY | Facility: CLINIC | Age: 59
End: 2024-02-01
Payer: COMMERCIAL

## 2024-02-01 VITALS
DIASTOLIC BLOOD PRESSURE: 80 MMHG | HEIGHT: 69 IN | BODY MASS INDEX: 28.29 KG/M2 | SYSTOLIC BLOOD PRESSURE: 112 MMHG | RESPIRATION RATE: 18 BRPM | WEIGHT: 191 LBS

## 2024-02-01 DIAGNOSIS — Z01.419 ENCOUNTER FOR ANNUAL ROUTINE GYNECOLOGICAL EXAMINATION: Primary | ICD-10-CM

## 2024-02-01 DIAGNOSIS — R63.5 WEIGHT GAIN: ICD-10-CM

## 2024-02-01 DIAGNOSIS — R53.83 OTHER FATIGUE: ICD-10-CM

## 2024-02-01 NOTE — PROGRESS NOTES
Gynecologic Annual Exam Note        GYN Annual Exam     CC - Here for annual exam.        [facility]  Rashmi Franchesca Buck is a 58 y.o. female, No obstetric history on file., who presents for annual well woman exam as a new patient.  She is postmenopausal.. Denies vaginal bleeding.   There were no changes to her medical or surgical history since her last visit. Marital Status: .  She is sexually active. She has not had new partners.. STD testing recommendations have been explained to the patient and she declines STD testing.    The patient would like to discuss the following complaints today: check hormone levels. Patient stated she is having weight gain and fatigue.    Additional OB/GYN History   On HRT? No    Last Pap : 2020. Results: negative. HPV: negative.   Last Completed Pap Smear       This patient has no relevant Health Maintenance data.          History of abnormal Pap smear: no  Family history of uterine, colon, breast, or ovarian cancer: yes - mother had colon cancer  Performs monthly Self-Breast Exam: yes  Last mammogram: N/A. Patient does not get mammograms   Last one about 38.  Dense breast tissue.  Concerned about risk of radiation.    Last Completed Mammogram            Discontinued - MAMMOGRAM  Discontinued      No completion history exists for this topic.                  Last colonoscopy: has had a cologuard 6 months ago    Last Completed Colonoscopy            COLORECTAL CANCER SCREENING (COLOGUARD - Every 3 Years) Next due on 8/17/2026 08/17/2023  Cologuard component of Cologuard - Stool, Per Rectum                    She has never had a bone density scan  Exercises Regularly: yes  Feelings of Anxiety or Depression: no      Tobacco Usage?: No       Current Outpatient Medications:     multivitamin with minerals tablet tablet, Take 1 tablet by mouth Daily., Disp: , Rfl:     NON FORMULARY, Thyroid Assist, Disp: , Rfl:     Patient denies the need for medication refills  "today.    OB History    No obstetric history on file.         Past Medical History:   Diagnosis Date    Allergic 1994    Dust, Mold, Grasses    Anemia 2016    From steriod/antibiotics causing bleeding lesions in my stomach    Ankle sprain 1984, 2015    Pulled ligaments    Bursitis of hip 2018    Headache 1980    Related to sinuses    Hypothyroidism 1998    Take Thyriod Assist supplement    Low back pain 1994    Fell down steps    Low back strain 1994    Fell down commercial steps    Lumbosacral disc disease 2016    Two herniated discs L5, L6    Visual impairment 2018    See fuzzy lines, blurry        History reviewed. No pertinent surgical history.    Health Maintenance   Topic Date Due    PAP SMEAR  Never done    INFLUENZA VACCINE  03/31/2024 (Originally 8/1/2023)    TDAP/TD VACCINES (1 - Tdap) 08/10/2024 (Originally 6/2/1982)    ZOSTER VACCINE (1 of 2) 08/10/2024 (Originally 6/2/2013)    COVID-19 Vaccine (1) 08/12/2024 (Originally 1965)    ANNUAL PHYSICAL  08/10/2024    BMI FOLLOWUP  10/31/2024    Annual Gynecologic Pelvic and Breast Exam  02/02/2025    COLORECTAL CANCER SCREENING  08/17/2026    HEPATITIS C SCREENING  Completed    Pneumococcal Vaccine 0-64  Aged Out    MAMMOGRAM  Discontinued       The additional following portions of the patient's history were reviewed and updated as appropriate: allergies, current medications, past family history, past medical history, past social history, past surgical history, and problem list.    Review of Systems    I have reviewed and agree with the HPI, ROS, and historical information as entered above. Karla Skelton MD      Objective   /80   Resp 18   Ht 176.5 cm (69.49\")   Wt 86.6 kg (191 lb)   LMP 02/01/2020 (Within Months)   BMI 27.81 kg/m²     Physical Exam  General:  well developed; well nourished  no acute distress  Skin:  No suspicious lesions seen  Thyroid: normal to inspection and palpation  Breasts:  Examined in supine position  Symmetric " without masses or skin dimpling  Nipples normal without inversion, lesions or discharge  There are no palpable axillary nodes  CVS: RRR, no M/R/G, distal pulses wnl  Resp: CTAB, No W/R/R  Abdomen: soft, non-tender; no masses  no umbilical or inguinal hernias are present  no hepato-splenomegaly  Pelvis: Clinical staff was present for exam  External genitalia:  normal appearance of the external genitalia including Bartholin's and Haugan's glands.  Urethra: no masses or tenderness  Urethral meatus: normal size;  No lesions or signs of prolapse  Bladder: non tender to palpation, no masses, no prolapse  Vagina:  normal pink mucosa without prolapse or lesions.  Cervix:  normal appearance.  Uterus:  normal size, shape and consistency.  Adnexa:  normal bimanual exam of the adnexa.  Perineum/Anus: normal appearance, no external hemorrhoids  Ext: 2+ pulses, no edema      Assessment and Plan    Problem List Items Addressed This Visit       Encounter for annual routine gynecological examination - Primary    Relevant Orders    LIQUID-BASED PAP SMEAR WITH HPV GENOTYPING IF ASCUS ([facility])    US Breast Bilateral Complete    Other fatigue    Relevant Orders    TSH    T4, Free    Follicle Stimulating Hormone    Luteinizing Hormone    Estradiol    Weight gain    Relevant Orders    TSH    T4, Free    Follicle Stimulating Hormone    Luteinizing Hormone    Estradiol       GYN annual well woman exam.   Reviewed monthly self breast exams.  Instructed to call with lumps, pain, or breast discharge.  Yearly mammograms ordered.  Recommended mammogram today.  Patient declines because she does not want the radiation.  At least will order breast ultrasound.  Reviewed exercise as a preventative health measures.   Colonoscopy recommended for family history of colon cancer at least at the end of 3 years as her Cologuard was negative.  Other: Estradiol FSH LH ordered per patient request.  I also think prudent to order TSH and free T4 as her  thyroid antibodies were abnormal in the past.  No risk factors for osteoporosis, DEXA recommended age 65  Return in about 1 year (around 2/1/2025) for Annual physical.       Karla Skelton MD  02/01/2024

## 2024-02-02 LAB
ESTRADIOL SERPL-MCNC: <5 PG/ML (ref 0–54.7)
FSH SERPL-ACNC: 83.6 MIU/ML (ref 25.8–134.8)
LH SERPL-ACNC: 47.8 MIU/ML (ref 7.7–58.5)
T4 FREE SERPL-MCNC: 0.97 NG/DL (ref 0.93–1.7)
TSH SERPL DL<=0.005 MIU/L-ACNC: 3.09 UIU/ML (ref 0.27–4.2)

## 2024-02-05 LAB — REF LAB TEST METHOD: NORMAL

## 2024-02-06 ENCOUNTER — HOSPITAL ENCOUNTER (OUTPATIENT)
Dept: MRI IMAGING | Facility: HOSPITAL | Age: 59
Discharge: HOME | End: 2024-02-06
Admitting: PHYSICIAN ASSISTANT
Payer: COMMERCIAL

## 2024-02-06 DIAGNOSIS — M79.604 RIGHT LEG PAIN: ICD-10-CM

## 2024-02-06 DIAGNOSIS — R29.898 RIGHT LEG WEAKNESS: ICD-10-CM

## 2024-02-06 DIAGNOSIS — M54.16 LUMBAR BACK PAIN WITH RADICULOPATHY AFFECTING RIGHT LOWER EXTREMITY: ICD-10-CM

## 2024-02-06 PROCEDURE — 72158 MRI LUMBAR SPINE W/O & W/DYE: CPT

## 2024-02-06 PROCEDURE — 0 GADOBENATE DIMEGLUMINE 529 MG/ML SOLUTION: Performed by: PHYSICIAN ASSISTANT

## 2024-02-06 PROCEDURE — A9577 INJ MULTIHANCE: HCPCS | Performed by: PHYSICIAN ASSISTANT

## 2024-02-06 RX ADMIN — GADOBENATE DIMEGLUMINE 18 ML: 529 INJECTION, SOLUTION INTRAVENOUS at 15:18

## 2024-02-09 ENCOUNTER — TELEPHONE (OUTPATIENT)
Dept: FAMILY MEDICINE CLINIC | Facility: CLINIC | Age: 59
End: 2024-02-09
Payer: COMMERCIAL

## 2024-02-12 LAB — CREAT BLDA-MCNC: 0.9 MG/DL (ref 0.6–1.3)

## 2024-02-14 ENCOUNTER — TELEPHONE (OUTPATIENT)
Dept: FAMILY MEDICINE CLINIC | Facility: CLINIC | Age: 59
End: 2024-02-14
Payer: COMMERCIAL

## 2024-02-15 DIAGNOSIS — J01.00 ACUTE MAXILLARY SINUSITIS, RECURRENCE NOT SPECIFIED: Primary | ICD-10-CM

## 2024-02-15 RX ORDER — CEFDINIR 300 MG/1
300 CAPSULE ORAL 2 TIMES DAILY
Qty: 20 CAPSULE | Refills: 0 | Status: SHIPPED | OUTPATIENT
Start: 2024-02-15

## 2024-02-27 ENCOUNTER — TELEPHONE (OUTPATIENT)
Dept: FAMILY MEDICINE CLINIC | Facility: CLINIC | Age: 59
End: 2024-02-27
Payer: COMMERCIAL

## 2024-02-27 NOTE — TELEPHONE ENCOUNTER
Neurology does not manage these types of conditions. Just because it is a neurosurgery consult, does not mean she will require surgery but they specialize in this type of pain conditions.

## 2024-02-27 NOTE — TELEPHONE ENCOUNTER
"  Caller: Rashmi Ospina \"Patria\"    Relationship: Self    Best call back number: [phone]     What is the best time to reach you: ANY    Who are you requesting to speak with (clinical staff, provider,  specific staff member): DR. ISNGH OR HER NURSE    What was the call regarding: THE PATIENT JUST CAME FROM A NEUROSURGERY APPOINTMENT AT THE [facility]. SHE DROVE AN HOUR TO GET THERE AND WAITED. SHE THEN WAS THERE WAS AN EMERGENCY AND THE DOCTOR WAS  UNABLE TO SEE HER. THERE WAS NO ONE IN THE OFFICE THAT COULD SEE HER. THE EARLIEST TO RESCHEDULE WOULD BE NEXT WEEK. SHE CANNOT RISK TAKING ANOTHER DAY OFF WORK AND DRIVING THAT FAR FOR THIS TO POSSIBLY HAPPEN AGAIN. IS THERE ANYWHERE CLOSER TO [place] THAT SHE CAN BE REFERRED INSTEAD AND CAN GET HER IN SOON? PLEASE CALL TO LET HER KNOW ASAP.     Is it okay if the provider responds through MyChart: NO      "

## 2024-02-27 NOTE — TELEPHONE ENCOUNTER
Pt called back and she would prefer a neurologist first if this is okay instead of neurosurgeon Call[facility] for sure

## 2024-02-27 NOTE — TELEPHONE ENCOUNTER
THE PATIENT STATED THAT DR LYON AT [facility] WILL ACCEPT HER AS A PATIENT IF A REFERRAL IS SENT OVER.

## 2024-02-28 NOTE — TELEPHONE ENCOUNTER
Per pt she does not want to go back to who she was referred to. She wants a new referral. Said she needed someone that deals with neuro-muscular issues. As close to [facility] as possible.

## 2024-02-29 ENCOUNTER — PATIENT ROUNDING (BHMG ONLY) (OUTPATIENT)
Dept: NEUROSURGERY | Facility: CLINIC | Age: 59
End: 2024-02-29
Payer: COMMERCIAL

## 2024-02-29 NOTE — PROGRESS NOTES
A MY-CHART MESSAGE HAS BEEN SENT TO THE PATIENT FOR PATIENT ROUNDING WITH [facility] [place] [facility].

## 2024-06-07 ENCOUNTER — TELEPHONE (OUTPATIENT)
Dept: FAMILY MEDICINE CLINIC | Facility: CLINIC | Age: 59
End: 2024-06-07
Payer: COMMERCIAL

## 2024-06-07 NOTE — TELEPHONE ENCOUNTER
"    Caller: Rashmi Ospina \"Patria\"    Relationship: Self    Best call back number: [phone]    What orders are you requesting (i.e. lab or imaging): MRI OF RIGHT HIP ABOVE THE KNEE AND GLOOT AREA     In what timeframe would the patient need to come in: AS SOON AS POSSIBLE     Where will you receive your lab/imaging services: [facility] OR [facility] OR [place]     Additional notes: THE PATIENTS LEG GAVE OUT WHEN SHE WAS SCOOTING A TABLE OUT OF THE WAY THE PATIENTS PAIN MANAGEMENT RECOMMENDED THAT SHE TALK TO SAADIA GEE ABOUT GETTING A TEST DONE TO LOOK AT THE MUSCLES LIGAMENTS AND TENDONS IN THAT AREA           "

## 2024-06-09 NOTE — TELEPHONE ENCOUNTER
Per chart message her pain management recommended ER evaluation if pain is severe. Can not order an MRI from a phone message without an evaluation. They noted they would discuss further management with her at her appointment later this month if warranted.

## 2024-06-26 ENCOUNTER — TELEPHONE (OUTPATIENT)
Dept: FAMILY MEDICINE CLINIC | Facility: CLINIC | Age: 59
End: 2024-06-26

## 2024-06-26 DIAGNOSIS — R26.2 INABILITY TO AMBULATE DUE TO RIGHT HIP: ICD-10-CM

## 2024-06-26 DIAGNOSIS — M25.351 HIP INSTABILITY, RIGHT: ICD-10-CM

## 2024-06-26 DIAGNOSIS — M25.551 RIGHT HIP PAIN: Primary | ICD-10-CM

## 2024-06-26 NOTE — TELEPHONE ENCOUNTER
Called pt per Lenny request, Waiting for Rashmi to make a decision, If she wants to wait on her appointment today to see if her Pain management appointment, on Friday wants to go ahead and order the MRI.

## 2024-06-26 NOTE — TELEPHONE ENCOUNTER
PT HAS CALLED BACK.   WANTS TO CANCELED THE APPOINTMENT FOR TODAY IF MRI CAN NOT BE SCHULDED .   PT HAS A APPOINTMENT OF FRIDAY WITH PAIN MANAGEMENT

## 2024-06-26 NOTE — TELEPHONE ENCOUNTER
Pt stated she had to stop PT because of the pain it was causing, 3 weeks ago.  After last PT she couldn't walk for 3 days  Can we order MRI?

## 2024-07-12 ENCOUNTER — HOSPITAL ENCOUNTER (OUTPATIENT)
Dept: MRI IMAGING | Facility: HOSPITAL | Age: 59
Discharge: HOME | End: 2024-07-12
Admitting: PHYSICIAN ASSISTANT
Payer: COMMERCIAL

## 2024-07-12 DIAGNOSIS — R26.2 INABILITY TO AMBULATE DUE TO RIGHT HIP: ICD-10-CM

## 2024-07-12 DIAGNOSIS — M25.351 HIP INSTABILITY, RIGHT: ICD-10-CM

## 2024-07-12 DIAGNOSIS — M25.551 RIGHT HIP PAIN: ICD-10-CM

## 2024-07-12 PROCEDURE — A9577 INJ MULTIHANCE: HCPCS | Performed by: PHYSICIAN ASSISTANT

## 2024-07-12 PROCEDURE — 0 GADOBENATE DIMEGLUMINE 529 MG/ML SOLUTION: Performed by: PHYSICIAN ASSISTANT

## 2024-07-12 PROCEDURE — 73723 MRI JOINT LWR EXTR W/O&W/DYE: CPT

## 2024-07-12 RX ADMIN — GADOBENATE DIMEGLUMINE 15 ML: 529 INJECTION, SOLUTION INTRAVENOUS at 15:38

## 2024-07-15 ENCOUNTER — TELEPHONE (OUTPATIENT)
Dept: FAMILY MEDICINE CLINIC | Facility: CLINIC | Age: 59
End: 2024-07-15
Payer: COMMERCIAL

## 2024-07-15 NOTE — TELEPHONE ENCOUNTER
"  Caller: Rashmi Buck \"Patria\"    Relationship: Self    Best call back number: [phone]     Caller requesting test results: SELF     What test was performed: MRI    When was the test performed: 07.12.24    Where was the test performed: [facility]    Additional notes: PATIENT WOULD LIKE A CALL ONCE SAADIA REVIEWS THESE. PLEASE CALL ASAP.   "

## 2024-07-16 ENCOUNTER — TELEPHONE (OUTPATIENT)
Dept: FAMILY MEDICINE CLINIC | Facility: CLINIC | Age: 59
End: 2024-07-16
Payer: COMMERCIAL

## 2024-07-16 NOTE — TELEPHONE ENCOUNTER
"Caller: Rashmi Buck \"Patria\"     Relationship: Self     Best call back number: [phone]      Caller requesting test results: SELF      What test was performed: MRI     When was the test performed: 07.12.24     Where was the test performed: [facility]     Additional notes: PATIENT WOULD LIKE A CALL ONCE SAADIA REVIEWS THESE. PLEASE CALL ASAP.   "

## 2024-07-17 ENCOUNTER — TELEPHONE (OUTPATIENT)
Dept: FAMILY MEDICINE CLINIC | Facility: CLINIC | Age: 59
End: 2024-07-17
Payer: COMMERCIAL

## 2024-07-17 NOTE — TELEPHONE ENCOUNTER
"Caller: Rashmi Buck \"Jessica\"    Relationship: Self    Best call back number: [phone]     What is the best time to reach you: ANYTIME    Who are you requesting to speak with (clinical staff, provider,  specific staff member): CLINICAL    Do you know the name of the person who called: JESSICA    What was the call regarding: PATIENT NOTICED THAT IN THE MRI IT STATES THEY DISCOVERED COLON DIVERTICULITIS.    Is it okay if the provider responds through MyChart: NO  "

## 2024-08-27 ENCOUNTER — HOSPITAL ENCOUNTER (OUTPATIENT)
Facility: HOSPITAL | Age: 59
Discharge: HOME | End: 2024-08-27
Admitting: PHYSICIAN ASSISTANT
Payer: COMMERCIAL

## 2024-08-27 ENCOUNTER — TELEPHONE (OUTPATIENT)
Dept: FAMILY MEDICINE CLINIC | Facility: CLINIC | Age: 59
End: 2024-08-27

## 2024-08-27 ENCOUNTER — LAB (OUTPATIENT)
Dept: FAMILY MEDICINE CLINIC | Facility: CLINIC | Age: 59
End: 2024-08-27
Payer: COMMERCIAL

## 2024-08-27 ENCOUNTER — OFFICE VISIT (OUTPATIENT)
Dept: FAMILY MEDICINE CLINIC | Facility: CLINIC | Age: 59
End: 2024-08-27
Payer: COMMERCIAL

## 2024-08-27 VITALS
OXYGEN SATURATION: 98 % | WEIGHT: 190 LBS | BODY MASS INDEX: 28.14 KG/M2 | HEIGHT: 69 IN | HEART RATE: 78 BPM | RESPIRATION RATE: 18 BRPM | DIASTOLIC BLOOD PRESSURE: 82 MMHG | SYSTOLIC BLOOD PRESSURE: 140 MMHG | TEMPERATURE: 98 F

## 2024-08-27 DIAGNOSIS — K59.09 CHRONIC CONSTIPATION: ICD-10-CM

## 2024-08-27 DIAGNOSIS — R10.13 EPIGASTRIC PAIN: Primary | ICD-10-CM

## 2024-08-27 DIAGNOSIS — K92.1 MELENA: ICD-10-CM

## 2024-08-27 LAB
BILIRUB BLD-MCNC: NEGATIVE MG/DL
CLARITY, POC: CLEAR
COLOR UR: YELLOW
EXPIRATION DATE: ABNORMAL
GLUCOSE UR STRIP-MCNC: NEGATIVE MG/DL
KETONES UR QL: NEGATIVE
LEUKOCYTE EST, POC: NEGATIVE
Lab: ABNORMAL
NITRITE UR-MCNC: NEGATIVE MG/ML
PH UR: 6 [PH] (ref 5–8)
PROT UR STRIP-MCNC: NEGATIVE MG/DL
RBC # UR STRIP: NEGATIVE /UL
SP GR UR: 1.02 (ref 1–1.03)
UROBILINOGEN UR QL: NORMAL

## 2024-08-27 PROCEDURE — 25510000001 IOPAMIDOL PER 1 ML: Performed by: PHYSICIAN ASSISTANT

## 2024-08-27 PROCEDURE — 74174 CTA ABD&PLVS W/CONTRAST: CPT

## 2024-08-27 PROCEDURE — 99214 OFFICE O/P EST MOD 30 MIN: CPT | Performed by: PHYSICIAN ASSISTANT

## 2024-08-27 PROCEDURE — 81003 URINALYSIS AUTO W/O SCOPE: CPT | Performed by: PHYSICIAN ASSISTANT

## 2024-08-27 RX ORDER — IOPAMIDOL 755 MG/ML
85 INJECTION, SOLUTION INTRAVASCULAR
Status: COMPLETED | OUTPATIENT
Start: 2024-08-27 | End: 2024-08-27

## 2024-08-27 RX ORDER — DOCUSATE SODIUM 100 MG/1
100 CAPSULE, LIQUID FILLED ORAL 2 TIMES DAILY PRN
Qty: 30 CAPSULE | Refills: 0 | Status: SHIPPED | OUTPATIENT
Start: 2024-08-27

## 2024-08-27 RX ORDER — SUCRALFATE 1 G/1
1 TABLET ORAL
Qty: 30 TABLET | Refills: 0 | Status: SHIPPED | OUTPATIENT
Start: 2024-08-27

## 2024-08-27 RX ADMIN — IOPAMIDOL 85 ML: 755 INJECTION, SOLUTION INTRAVENOUS at 15:57

## 2024-08-27 NOTE — TELEPHONE ENCOUNTER
"  Caller: Rashmi Buck \"Patria\"     Relationship: SELF     Best call back number:  [phone]    What is your medical concern? PATIENT  HAD LOWER ABDOMEN PAIN LAST NIGHT  AND PATIENT HAS PAIN IN UPPER ABDOMINAL AREA TODAY; SHE ALSO IS HAVING DARK STOOLS TO GO ALONG WITH ABDOMINAL PAIN    How long has this issue been going on? DARK LOSE  STOOLS HAS BEEN GOING ON SINCE SUNDAY, AND THE ABDOMINAL PAIN SINCE LAST  NIGHT; ALSO PATIENT MENTIONED HER BLOOD PRESSURE WAS SLIGHTLY UP;     PAIN HAD A PROCEDURE YESTERDAY WITH HER RIGHT HIP, NUMBED THE JOINT AND IT WAS NOT, SHE STILL HAS PAIN IN HER LEG AS WELL    Is your provider already aware of this issue? NO    Have you been treated for this issue? NO        "

## 2024-08-27 NOTE — PROGRESS NOTES
Subjective   Rashmi Buck is a 59 y.o. female.     History of Present Illness   History of Present Illness  The patient presents for evaluation of abdominal pain.    She experienced sharp abdominal pain last night, followed by a loose, dark/ black bowel movement. This morning, she woke up with crampy pain in the upper part of her abdomen and a feeling of bloating. Her bowel movement was again loose and dark. The bloating sensation started yesterday. She does not feel nauseous after eating and still has her appendix. She has not vomited but feels slightly nauseous. Her appetite and fluid intake have been normal since the onset of symptoms yesterday.    She has a history of constipation, which she manages with daily fiber intake. Occasionally, she experiences straining during bowel movements. She experiences indigestion or reflux depending on her diet and had a severe episode of indigestion last week. She takes aspirin once or twice a week for leg pain. She underwent a procedure yesterday involving Omnipaque dye for IT band syndrome, adductor tendinitis, and bursitis. She received sugar water injections into her bursa and IT band. An MRI revealed potential tendon tears. She has never had a negative reaction to contrast.    She has no history of fibroids and has not experienced any bleeding or spotting since menopause. She had an ovarian cyst before menopause but none after. She is not aware of any gallbladder issues. Occasionally, she experiences sharp pain when bending over to tie her shoe, which resolves spontaneously. She reports no urinary symptoms such as frequency, urgency, or burning. She reports no back pain or gnawing sensation. She had right-sided back pain a few days ago, which resolved after a chiropractic adjustment. She does not feel woozy or lightheaded.    Her blood pressure was slightly elevated yesterday, which was attributed to anxiety. Elevated in office today as well. She has not  "traveled recently. In 2016, she was treated with four doses of antibiotics and steroids for sinus infections and allergies, which resulted in three bleeding lesions in her stomach. She experienced tachycardia and black stools at that time.       The following portions of the patient's history were reviewed and updated as appropriate: allergies, current medications, past family history, past medical history, past social history, past surgical history, and problem list.    Review of Systems  As noted per HPI     Objective   Blood pressure 140/82, pulse 78, temperature 98 °F (36.7 °C), resp. rate 18, height 176.5 cm (69.49\"), weight 86.2 kg (190 lb), last menstrual period 02/01/2020, SpO2 98%.     Physical Exam  Vitals and nursing note reviewed.   Constitutional:       Appearance: Normal appearance. She is well-developed.   HENT:      Head: Normocephalic and atraumatic.      Right Ear: External ear normal.      Left Ear: External ear normal.      Nose: Nose normal.      Mouth/Throat:      Pharynx: No oropharyngeal exudate.   Eyes:      Conjunctiva/sclera: Conjunctivae normal.   Neck:      Thyroid: No thyromegaly.      Trachea: No tracheal deviation.   Cardiovascular:      Rate and Rhythm: Normal rate and regular rhythm.      Heart sounds: Normal heart sounds.   Pulmonary:      Effort: Pulmonary effort is normal. No respiratory distress.      Breath sounds: Normal breath sounds. No wheezing or rales.   Chest:      Chest wall: No tenderness.   Abdominal:      General: Bowel sounds are normal. There is no distension.      Palpations: Abdomen is soft. There is no mass.      Tenderness: There is abdominal tenderness in the epigastric area. There is guarding. There is no rebound.      Hernia: No hernia is present.   Musculoskeletal:      Cervical back: Normal range of motion and neck supple.   Lymphadenopathy:      Cervical: No cervical adenopathy.   Skin:     General: Skin is warm and dry.   Neurological:      Mental " Status: She is alert and oriented to person, place, and time.   Psychiatric:         Mood and Affect: Mood normal.         Behavior: Behavior normal.         Thought Content: Thought content normal.         Judgment: Judgment normal.         Results      Assessment & Plan   Assessment & Plan  1. Abdominal pain.  She reports sharp pain in the lower abdomen, accompanied by loose and dark/ black bowel movements. The pain is currently localized in the upper part of her belly with bloating. She has a history of constipation, which has been more regular with daily fiber intake. There is no history of gallbladder issues, and she has not experienced nausea post-meal or pain radiating to her back. The pain is in the epigastric region, which could indicate pancreatic or referred abdominal pain. Blood work will be conducted to check her blood count, metabolic panel, and lipase levels. A CT scan will be performed to evaluate her symptoms further. She is advised to monitor her blood pressure twice daily. If there is no improvement or if symptoms worsen over the next few days, a referral to a gastroenterologist will be made for endoscopy/ colonoscopy. Report to ER if new or worsening symptoms develop     2. Black stools.  The presence of black stools suggests possible upper gastrointestinal bleeding. Blood work will be conducted to assess her blood count, metabolic panel, and lipase levels. A CT scan will be performed for further evaluation. If there is no improvement or if symptoms worsen over the next few days, a referral to a gastroenterologist will be made for endoscopy. She should avoid the emergency room unless absolutely necessary. Immediate hospitalization is recommended if she experiences worsening symptoms            Diagnoses and all orders for this visit:    1. Epigastric pain (Primary)  -     POCT urinalysis dipstick, automated    -     CT Angiogram Abdomen Pelvis  -     CBC w AUTO Differential  -     Comprehensive  metabolic panel  -     Lipase    2. Melena  -     CT Angiogram Abdomen Pelvis  -     CBC w AUTO Differential  -     Comprehensive metabolic panel  -     Lipase    UA normal.              Patient or patient representative verbalized consent for the use of Ambient Listening during the visit with  GIOVANNI Gamble for chart documentation. 8/27/2024  13:12 EDT

## 2024-08-27 NOTE — TELEPHONE ENCOUNTER
"Caller: Rashmi Buck \"Patria\"    Relationship: Self    Best call back number: [phone]     Caller requesting test results: RASHMI BUCK     What test was performed: BLOOD WORK AND ABDOMINAL CT SCAN     When was the test performed: 8/27/24     Where was the test performed: BLOOD WORK-IN OFFICE, CT SCAN- [facility]     Additional notes: PLEASE CALL PATIENT BACK TO DISCUSS RESULTS AND NEXT STEPS.     "

## 2024-08-28 ENCOUNTER — TELEPHONE (OUTPATIENT)
Dept: FAMILY MEDICINE CLINIC | Facility: CLINIC | Age: 59
End: 2024-08-28
Payer: COMMERCIAL

## 2024-08-28 DIAGNOSIS — R19.5 DARK STOOLS: Primary | ICD-10-CM

## 2024-08-28 DIAGNOSIS — R19.7 DIARRHEA, UNSPECIFIED TYPE: ICD-10-CM

## 2024-08-28 LAB
ALBUMIN SERPL-MCNC: 4.5 G/DL (ref 3.9–4.9)
ALP SERPL-CCNC: 49 IU/L (ref 44–121)
ALT SERPL-CCNC: 17 IU/L (ref 0–32)
AST SERPL-CCNC: 20 IU/L (ref 0–40)
BASOPHILS # BLD AUTO: 0.1 X10E3/UL (ref 0–0.2)
BASOPHILS NFR BLD AUTO: 1 %
BILIRUB SERPL-MCNC: 0.3 MG/DL (ref 0–1.2)
BUN SERPL-MCNC: 18 MG/DL (ref 8–27)
BUN/CREAT SERPL: 26 (ref 12–28)
CALCIUM SERPL-MCNC: 10.2 MG/DL (ref 8.7–10.3)
CHLORIDE SERPL-SCNC: 102 MMOL/L (ref 96–106)
CO2 SERPL-SCNC: 25 MMOL/L (ref 20–29)
CREAT SERPL-MCNC: 0.7 MG/DL (ref 0.57–1)
EGFRCR SERPLBLD CKD-EPI 2021: 98 ML/MIN/1.73
EOSINOPHIL # BLD AUTO: 0 X10E3/UL (ref 0–0.4)
EOSINOPHIL NFR BLD AUTO: 0 %
ERYTHROCYTE [DISTWIDTH] IN BLOOD BY AUTOMATED COUNT: 13.3 % (ref 11.7–15.4)
GLOBULIN SER CALC-MCNC: 2.6 G/DL (ref 1.5–4.5)
GLUCOSE SERPL-MCNC: 95 MG/DL (ref 70–99)
HCT VFR BLD AUTO: 41.8 % (ref 34–46.6)
HGB BLD-MCNC: 13.3 G/DL (ref 11.1–15.9)
IMM GRANULOCYTES # BLD AUTO: 0 X10E3/UL (ref 0–0.1)
IMM GRANULOCYTES NFR BLD AUTO: 0 %
LIPASE SERPL-CCNC: 60 U/L (ref 14–72)
LYMPHOCYTES # BLD AUTO: 2.2 X10E3/UL (ref 0.7–3.1)
LYMPHOCYTES NFR BLD AUTO: 24 %
MCH RBC QN AUTO: 30.4 PG (ref 26.6–33)
MCHC RBC AUTO-ENTMCNC: 31.8 G/DL (ref 31.5–35.7)
MCV RBC AUTO: 96 FL (ref 79–97)
MONOCYTES # BLD AUTO: 0.7 X10E3/UL (ref 0.1–0.9)
MONOCYTES NFR BLD AUTO: 8 %
NEUTROPHILS # BLD AUTO: 6.3 X10E3/UL (ref 1.4–7)
NEUTROPHILS NFR BLD AUTO: 67 %
PLATELET # BLD AUTO: 239 X10E3/UL (ref 150–450)
POTASSIUM SERPL-SCNC: 4 MMOL/L (ref 3.5–5.2)
PROT SERPL-MCNC: 7.1 G/DL (ref 6–8.5)
RBC # BLD AUTO: 4.37 X10E6/UL (ref 3.77–5.28)
SODIUM SERPL-SCNC: 141 MMOL/L (ref 134–144)
WBC # BLD AUTO: 9.2 X10E3/UL (ref 3.4–10.8)

## 2024-08-28 NOTE — TELEPHONE ENCOUNTER
PATIENT WANTED TO LET PROVIDER KNOW THAT THE HIGH ABDOMINAL PAIN IS GONE. SHE HAS A LIGHT CRAMP FEELING. SHE STILL DOES HAVE THE DARK STOOL AND BLOATING.

## 2024-08-29 ENCOUNTER — TELEPHONE (OUTPATIENT)
Dept: FAMILY MEDICINE CLINIC | Facility: CLINIC | Age: 59
End: 2024-08-29
Payer: COMMERCIAL

## 2024-08-29 NOTE — TELEPHONE ENCOUNTER
Will proceed with GI consult. She may also come into office to obtain stool culture kit to send off to check for infection.  If new or worsening symptoms develop over the weekend, report to ER!

## 2024-08-29 NOTE — TELEPHONE ENCOUNTER
"Spoke with pt regarding lab results and she had a question regarding her CT scan that you went over with her yesterday where it states \"There is questionable asymmetric wall thickening of the gastric pylorus\"  "

## 2024-08-29 NOTE — TELEPHONE ENCOUNTER
Patient not having symptoms consistent with pyloric stenosis which would include significant vomiting as food can not move from the stomach to the small intestine. Can not exclude some inflammation in the area, but did not appear obstructive. Just needs to progress with EGD scope as discussed and has been referred to GI.

## 2024-09-03 ENCOUNTER — TELEPHONE (OUTPATIENT)
Dept: FAMILY MEDICINE CLINIC | Facility: CLINIC | Age: 59
End: 2024-09-03
Payer: COMMERCIAL

## 2024-09-03 DIAGNOSIS — J01.00 ACUTE MAXILLARY SINUSITIS, RECURRENCE NOT SPECIFIED: ICD-10-CM

## 2024-09-03 RX ORDER — CEFDINIR 300 MG/1
300 CAPSULE ORAL 2 TIMES DAILY
Qty: 20 CAPSULE | Refills: 0 | Status: CANCELLED | OUTPATIENT
Start: 2024-09-03

## 2024-09-03 NOTE — TELEPHONE ENCOUNTER
PATIENT CALLED AND STATED SHE IS GETTING A SINUS INFECTION FROM HER ALLERGIES AND MARLEN USUALLY PRESCRIBES HER CEFDINIR. [facility]

## 2024-09-04 ENCOUNTER — TELEMEDICINE (OUTPATIENT)
Dept: FAMILY MEDICINE CLINIC | Facility: CLINIC | Age: 59
End: 2024-09-04
Payer: COMMERCIAL

## 2024-09-04 VITALS — WEIGHT: 188 LBS | BODY MASS INDEX: 27.85 KG/M2 | HEIGHT: 69 IN

## 2024-09-04 DIAGNOSIS — J01.00 ACUTE MAXILLARY SINUSITIS, RECURRENCE NOT SPECIFIED: Primary | ICD-10-CM

## 2024-09-04 PROCEDURE — 99213 OFFICE O/P EST LOW 20 MIN: CPT | Performed by: NURSE PRACTITIONER

## 2024-09-04 RX ORDER — CEFDINIR 300 MG/1
300 CAPSULE ORAL 2 TIMES DAILY
Qty: 14 CAPSULE | Refills: 0 | Status: SHIPPED | OUTPATIENT
Start: 2024-09-04 | End: 2024-09-11

## 2024-09-04 NOTE — PROGRESS NOTES
Telehealth E-Visit      Patient Name: Rashmi Buck  : 1965   MRN: [id number]     Chief Complaint:    Chief Complaint   Patient presents with    Illness     Seasonal allergies, sore throat, headache, cough, ear pain. Duration of 1 wk.       I have reviewed the E-Visit questionnaire and the patient's answers, my assessment and plan are listed below.     This provider is located at the [facility] (through [facility]), [address] [place] [address] using a secure TERMINAL[employer]OUR Video Visit through Zoom. Patient is being seen remotely via telehealth at their home address in [place], and stated they are in a secure environment for this session. The patient's condition being diagnosed/treated is appropriate for telemedicine. The provider identified herself as well as her credentials. The patient, and/or patients guardian, consent to be seen remotely, and when consent is given they understand that the consent allows for patient identifiable information to be sent to a third party as needed. They may refuse to be seen remotely at any time. The electronic data is encrypted and password protected, and the patient and/or guardian has been advised of the potential risks to privacy not withstanding such measures.    You have chosen to receive care through a telehealth visit. Do you consent to use a video/audio connection for your medical care today? Yes    History of Present Illness: Rashmi Buck is a 59 y.o. female who is here today to follow up with sinus pressure and congestion, right fullness, sore throat, sputum production, yellow ing color that started 5 days ago. Claritin, and theraflu sinus without relief of symptoms. No other associated symptoms.       Subjective        I have reviewed and the following portions of the patient's history were updated as appropriate: past family history, past medical history, past social history, past surgical  "history and problem list.    Medications:     Current Outpatient Medications:     docusate sodium (Colace) 100 MG capsule, Take 1 capsule by mouth 2 (Two) Times a Day As Needed for Constipation., Disp: 30 capsule, Rfl: 0    multivitamin with minerals tablet tablet, Take 1 tablet by mouth Daily., Disp: , Rfl:     NON FORMULARY, Thyroid Assist, Disp: , Rfl:     omeprazole (priLOSEC) 20 MG capsule, Take 1 capsule by mouth Daily., Disp: 14 capsule, Rfl: 0    sucralfate (Carafate) 1 g tablet, Take 1 tablet by mouth 3 (Three) Times a Day Before Meals., Disp: 30 tablet, Rfl: 0    cefdinir (OMNICEF) 300 MG capsule, Take 1 capsule by mouth 2 (Two) Times a Day for 7 days., Disp: 14 capsule, Rfl: 0    Allergies:   Allergies   Allergen Reactions    Other Other (See Comments)     Steroids cause bleeding lesions in stomach per patient     Augmentin [Amoxicillin-Pot Clavulanate] Other (See Comments)     Colitis        Objective     Physical Exam:  Vital Signs:   Vitals:    09/04/24 1654   Weight: 85.3 kg (188 lb)   Height: 176.5 cm (69.49\")     Body mass index is 27.37 kg/m².    Physical Exam  Vitals and nursing note reviewed.   Constitutional:       Appearance: Normal appearance. She is normal weight.   HENT:      Head: Normocephalic and atraumatic.   Pulmonary:      Effort: Pulmonary effort is normal.   Neurological:      Mental Status: She is alert and oriented to person, place, and time.           Assessment / Plan      Assessment/Plan:   Diagnoses and all orders for this visit:    1. Acute maxillary sinusitis, recurrence not specified (Primary)  -     cefdinir (OMNICEF) 300 MG capsule; Take 1 capsule by mouth 2 (Two) Times a Day for 7 days.  Dispense: 14 capsule; Refill: 0       Increase fluid intake  Take medication as prescribed in plan  Monitor for worsening symptoms  Tylenol and ibuprofen as needed for aches and fever.  Go to the ER for any shortness of breath, chest pains, fever uncontrolled by medications.    Follow Up: "   Return if symptoms worsen or fail to improve.    Any medications prescribed have been sent electronically to   [id number] - [facility] - [place], [place] - [address] AT [facility] - [phone]  - [phone] FX  [address]  [facility] [place] [address]  Phone: [phone] Fax: [phone]      5 minutes were spent reviewing the patient's questionnaire, formulating a treatment plan, and relaying information to the patient via [employer].    LEE Hall    [facility]  09/04/24  17:00 EDT

## 2024-09-26 ENCOUNTER — OFFICE VISIT (OUTPATIENT)
Dept: FAMILY MEDICINE CLINIC | Facility: CLINIC | Age: 59
End: 2024-09-26
Payer: COMMERCIAL

## 2024-09-26 VITALS
DIASTOLIC BLOOD PRESSURE: 82 MMHG | SYSTOLIC BLOOD PRESSURE: 122 MMHG | WEIGHT: 188 LBS | BODY MASS INDEX: 27.85 KG/M2 | OXYGEN SATURATION: 99 % | TEMPERATURE: 97.5 F | HEART RATE: 85 BPM | HEIGHT: 69 IN

## 2024-09-26 DIAGNOSIS — J06.9 ACUTE URI: Primary | ICD-10-CM

## 2024-09-26 PROCEDURE — 99213 OFFICE O/P EST LOW 20 MIN: CPT | Performed by: PHYSICIAN ASSISTANT

## 2024-09-26 RX ORDER — DOXYCYCLINE 100 MG/1
100 CAPSULE ORAL 2 TIMES DAILY
Qty: 20 CAPSULE | Refills: 0 | Status: SHIPPED | OUTPATIENT
Start: 2024-09-26

## 2024-09-26 RX ORDER — BENZONATATE 100 MG/1
100 CAPSULE ORAL 3 TIMES DAILY PRN
Qty: 30 CAPSULE | Refills: 0 | Status: SHIPPED | OUTPATIENT
Start: 2024-09-26

## 2024-09-26 RX ORDER — DEXTROSE MONOHYDRATE 50 MG/ML
100 INJECTION, SOLUTION INTRAVENOUS
COMMUNITY
Start: 2024-09-19

## 2024-09-26 NOTE — PROGRESS NOTES
"Subjective   Rashmi Buck is a 59 y.o. female.     History of Present Illness   History of Present Illness  The patient presents for evaluation of sinus issues.    She reports persistent sinus problems that began after exposure to a moldy environment during a meeting at an event center two weeks ago. This exposure led to breathing difficulties. Despite taking allergy medication, she experienced an earache and now feels discomfort in her chest. She describes a sensation of pressure and has been coughing up thick, milky white phlegm, which has irritated her throat. She has been managing her symptoms with Robitussin and Mucinex for the past two weeks. She typically uses Claritin for her allergies and has not tried Sudafed. She reports no fevers or chills.    Additionally, she mentions that her gastrointestinal issues have improved. She also notes that her last set of sugar injections in her leg were effective.       The following portions of the patient's history were reviewed and updated as appropriate: allergies, current medications, past family history, past medical history, past social history, past surgical history, and problem list.    Review of Systems  As noted per HPI     Objective   Blood pressure 122/82, pulse 85, temperature 97.5 °F (36.4 °C), height 175.3 cm (69\"), weight 85.3 kg (188 lb), last menstrual period 02/01/2020, SpO2 99%.   Physical Exam  Vitals reviewed.   Constitutional:       Appearance: Normal appearance.   HENT:      Head: Normocephalic.      Right Ear: Tympanic membrane and ear canal normal.      Left Ear: Tympanic membrane and ear canal normal.      Nose: Congestion present.      Mouth/Throat:      Pharynx: No oropharyngeal exudate or posterior oropharyngeal erythema.   Eyes:      Conjunctiva/sclera: Conjunctivae normal.   Cardiovascular:      Rate and Rhythm: Normal rate and regular rhythm.   Pulmonary:      Effort: Pulmonary effort is normal.      Breath sounds: Normal " breath sounds.   Neurological:      Mental Status: She is alert.   Psychiatric:         Mood and Affect: Mood normal.         Behavior: Behavior normal.         Results      Assessment & Plan   Assessment & Plan  1. Sinusitis.  She reports persistent sinus issues with symptoms including chest pressure, thick yellowish to milky white mucus, and throat irritation. No fevers or chills were noted. She is advised to switch from cefdinir to doxycycline, taken twice a day, to avoid antibiotic resistance and cover a broader spectrum of bacteria, especially considering potential mold exposure. A prescription for doxycycline will be sent to her pharmacy. She is also advised to try Tessalon Perles (benzonatate) to help numb her throat and viscous lidocaine for additional relief. She should continue taking her multivitamin but separate it from the antibiotic by a few hours to ensure proper absorption. She is instructed to take the antibiotic with food and to contact the office if any issues arise.    2. Allergic Rhinitis.  She experienced an exacerbation of symptoms after exposure to mold, leading to difficulty breathing and earache. She is advised to try a decongestant with pseudoephedrine, such as Claritin-D, to help alleviate her symptoms. She should be aware of potential side effects like heart racing and anxiety. If she decides to try it, she can obtain it from behind the pharmacy counter by showing her ID.    3. Health Maintenance.  She should continue her current multivitamin regimen, ensuring it is taken a few hours apart from the antibiotic to ensure proper absorption.       Diagnoses and all orders for this visit:    1. Acute URI (Primary)  -     doxycycline (VIBRAMYCIN) 100 MG capsule; Take 1 capsule by mouth 2 (Two) Times a Day.  Dispense: 20 capsule; Refill: 0  -     benzonatate (Tessalon Perles) 100 MG capsule; Take 1 capsule by mouth 3 (Three) Times a Day As Needed for Cough.  Dispense: 30 capsule; Refill:  0               Patient or patient representative verbalized consent for the use of Ambient Listening during the visit with  GIOVANNI Gamble for chart documentation. 10/6/2024  16:46 EDT

## 2024-10-01 ENCOUNTER — TELEPHONE (OUTPATIENT)
Dept: FAMILY MEDICINE CLINIC | Facility: CLINIC | Age: 59
End: 2024-10-01
Payer: COMMERCIAL

## 2024-10-01 NOTE — TELEPHONE ENCOUNTER
"    Caller: Rashmi Buck \"Patria\"    Relationship: Self    Best call back number: [phone]     Which medication are you concerned about:   benzonatate (Tessalon Perles) 100 MG capsule   doxycycline (VIBRAMYCIN) 100 MG capsule     Who prescribed you this medication: SAADIA DAVILA     When did you start taking this medication: 09/26/2024     What are your concerns: PATIENT STATED THAT SHE IS ONLY ABLE TO TAKE THE DOXYCYCLINE IN THE AFTERNOON DUE TO IF SHE TAKES THE MEDICATION IN THE MORNING IT CAUSES HER TO GET VERY DIZZY, SHAKY AND LIGHTHEADED AND PATIENT IS CONCERNED WITH HER ONLY TAKING THE MEDICATION ONCE DAILY IF THE MEDICATION WILL BE ABLE TO HELP OR NOT     PATIENT STATED THAT THE MEDICATION BENZONATATE IS CAUSING HER TO HAVE A VERY RASPY VOICE AND PATIENT IS NOT SURE IF THIS IS A SIDE EFFECT OR SOMETHING SHE SHOULD BE CONCERNED ABOUT       "

## 2024-10-01 NOTE — TELEPHONE ENCOUNTER
Okay to continue with doxy once daily. May discontinue tessalon perle if causing raspy voice, this is strictly symptomatic to help with cough. Can use OTC cough medication

## 2025-01-08 ENCOUNTER — TELEPHONE (OUTPATIENT)
Dept: FAMILY MEDICINE CLINIC | Facility: CLINIC | Age: 60
End: 2025-01-08
Payer: COMMERCIAL

## 2025-01-08 NOTE — TELEPHONE ENCOUNTER
"  Caller: CieloRashmi \"Patria\"    Relationship: Self    Best call back number:[phone]     What is the best time to reach you: ANYTIME     Who are you requesting to speak with (clinical staff, provider,  specific staff member): GIOVANNI COLON OR HER NURSE     Do you know the name of the person who called: THE PATIENT PATRIA    What was the call regarding: THE PATIENT PATRIA REPORTS SHE HAD A FALL IN A PARKING LOT IN MED NOVEMBER 2024, STATED SHE HAS EXPERIENCED AN INCREASED STABBING PAIN IN HER UPPER LEG AND RIGHT SIDE HIP AREA NEAR BUTTOCKS, AND PRESSURE ON OUTSIDE TOP OF RIGHT KNEE. THE PATIENT STATED THAT THIS STABBING PAIN HAS GOTTEN WORSE INSTEAD OF BETTER AND IS REQUESTING A CALL BACK TO DISCUSS.     Is it okay if the provider responds through MyChart: NO  "

## 2025-01-13 ENCOUNTER — HOSPITAL ENCOUNTER (OUTPATIENT)
Dept: GENERAL RADIOLOGY | Facility: HOSPITAL | Age: 60
Discharge: HOME | End: 2025-01-13
Admitting: PHYSICIAN ASSISTANT
Payer: COMMERCIAL

## 2025-01-13 ENCOUNTER — OFFICE VISIT (OUTPATIENT)
Dept: FAMILY MEDICINE CLINIC | Facility: CLINIC | Age: 60
End: 2025-01-13
Payer: COMMERCIAL

## 2025-01-13 VITALS
HEIGHT: 69 IN | RESPIRATION RATE: 12 BRPM | OXYGEN SATURATION: 99 % | SYSTOLIC BLOOD PRESSURE: 116 MMHG | DIASTOLIC BLOOD PRESSURE: 78 MMHG | TEMPERATURE: 97.1 F | WEIGHT: 190 LBS | BODY MASS INDEX: 28.14 KG/M2 | HEART RATE: 93 BPM

## 2025-01-13 DIAGNOSIS — G89.29 CHRONIC RIGHT HIP PAIN: ICD-10-CM

## 2025-01-13 DIAGNOSIS — M25.551 CHRONIC RIGHT HIP PAIN: ICD-10-CM

## 2025-01-13 DIAGNOSIS — M25.551 CHRONIC RIGHT HIP PAIN: Primary | ICD-10-CM
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
## 2025-01-13 DIAGNOSIS — G89.29 CHRONIC RIGHT HIP PAIN: Primary | ICD-10-CM
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
## 2025-01-13 PROCEDURE — 73502 X-RAY EXAM HIP UNI 2-3 VIEWS: CPT

## 2025-01-13 PROCEDURE — 99213 OFFICE O/P EST LOW 20 MIN: CPT | Performed by: PHYSICIAN ASSISTANT

## 2025-01-13 RX ORDER — ESTRADIOL 0.05 MG/D
1 PATCH, EXTENDED RELEASE TRANSDERMAL
COMMUNITY
Start: 2024-12-12

## 2025-01-13 RX ORDER — HYDROCODONE BITARTRATE AND ACETAMINOPHEN 5; 325 MG/1; MG/1
1 TABLET ORAL EVERY 6 HOURS PRN
Qty: 12 TABLET | Refills: 0 | Status: SHIPPED | OUTPATIENT
Start: 2025-01-13

## 2025-01-13 RX ORDER — PROGESTERONE 100 MG/1
100 CAPSULE ORAL
COMMUNITY
Start: 2024-12-11

## 2025-01-13 NOTE — PROGRESS NOTES
Subjective   Rashmi Buck is a 59 y.o. female.     History of Present Illness   History of Present Illness  The patient presents for evaluation of hip pain.    She experienced a fall in November 2024 while navigating an uneven parking lot, resulting in a trip and subsequent impact on her right hip. She did not seek immediate medical attention following the fall but has considered it in recent days due to persistent pain. She has been under the care of a pain management specialist since the incident, receiving a piriformis injection that provided temporary relief. However, over the past 2 to 3 weeks, her pain has progressively worsened. She reports a significant bruise from the fall, which remains visible. She is reluctant to undergo further x-rays at this time. Her daily pain level is rated between 8 and 9, managed with aspirin. She was offered Celebrex but declined due to concurrent hormone replacement therapy and a history of bleeding lesions. She has received injections for bursitis, IT band, gluteus, and piriformis, but reports escalating pain intensity, particularly post-fall. She suspects a recurrence of bursitis, describing a sensation of sitting on bone. She attempted to dance on New Year's [place] but was thwarted by severe pain. Despite her pain, she continues to drive short distances. Prior to the fall, she rated her pain at 6. She experiences sharp, stabbing pain during transitions from sitting to standing and while walking, accompanied by a feeling of leg instability. She is unable to sleep on her right side but does not report frequent awakenings. She does not experience groin pain but reports a squeezing sensation above her knee when stepping. She has not previously taken Norco and expresses a dislike for steroids. She has been advised to continue pain management and return for further evaluation if symptoms persist. She has been informed that plasma treatment may be an option, but she is  "currently focused on non-surgical interventions.           The following portions of the patient's history were reviewed and updated as appropriate: allergies, current medications, past family history, past medical history, past social history, past surgical history, and problem list.    Review of Systems  As noted per HPI     Objective   Blood pressure 116/78, pulse 93, temperature 97.1 °F (36.2 °C), resp. rate 12, height 175.3 cm (69\"), weight 86.2 kg (190 lb), last menstrual period 02/01/2020, SpO2 99%. Body mass index is 28.06 kg/m².   Physical Exam  Vitals reviewed.   Constitutional:       Appearance: Normal appearance.   Cardiovascular:      Rate and Rhythm: Normal rate.   Pulmonary:      Effort: Pulmonary effort is normal.      Breath sounds: Normal breath sounds.   Musculoskeletal:      Right hip: Tenderness and bony tenderness present.   Neurological:      Mental Status: She is alert and oriented to person, place, and time.         Results      Assessment & Plan   Assessment & Plan  1. Hip pain.  The patient's hip pain has intensified following a fall in November 2024, with a current pain level of 8-9 out of 10. She has been managing the pain with aspirin but has declined Celebrex due to concerns about hormone replacement therapy and a history of bleeding lesions. She has received multiple injections, including bursitis, IT band, gluteus, and piriformis injections, with limited relief. The pain is described as stabbing and sharp, worsening with movement and making it feel like her leg might give out. She is unable to sleep on her right side but can sleep on her left side or back without waking up frequently. An x-ray of the hip will be ordered to rule out any fractures. Norco will be prescribed for pain management, to be taken with food and only as needed. She is advised to avoid driving long distances and to limit physical activity until further notice.    PROCEDURE  The patient has received injections for " bursitis, IT band, gluteus, and piriformis in the past.     Diagnoses and all orders for this visit:    1. Chronic right hip pain (Primary)  -     XR Hip With or Without Pelvis 2 - 3 View Right    2. Fall, subsequent encounter  -     XR Hip With or Without Pelvis 2 - 3 View Right               Patient or patient representative verbalized consent for the use of Ambient Listening during the visit with  GIOVANNI Gamble for chart documentation. 1/26/2025  15:57 EST

## 2025-01-23 ENCOUNTER — TELEPHONE (OUTPATIENT)
Dept: FAMILY MEDICINE CLINIC | Facility: CLINIC | Age: 60
End: 2025-01-23

## 2025-01-23 NOTE — TELEPHONE ENCOUNTER
"    Caller: Rashmi Buck \"Patria\"    Relationship: Self    Best call back number: [phone]         What test was performed: ULTRASOUND    When was the test performed: 01/17/25    Where was the test performed: [facility]    Additional notes: PATIENT WOULD LIKE TO DISCUSS THE RESULTS IF WE HAVE ACCESS TO THEM. IF NOT, COULD WE PLEASE REQUEST THEM.                       "

## 2025-05-23 ENCOUNTER — TELEPHONE (OUTPATIENT)
Dept: FAMILY MEDICINE CLINIC | Facility: CLINIC | Age: 60
End: 2025-05-23
Payer: COMMERCIAL

## 2025-05-23 DIAGNOSIS — E04.1 THYROID NODULE: Primary | ICD-10-CM

## 2025-05-23 NOTE — TELEPHONE ENCOUNTER
"Caller: Rashmi Buck \"Patria\"    Relationship: Self    Best call back number: [phone]     What is the medical concern/diagnosis: ENLARGED THYROID NODULES     What specialty or service is being requested: ENDOCRINOLOGY     What is the provider, practice or medical service name: DR GENESIS WARREN    What is the office location: [facility]     What is the office phone number: [phone]    Any additional details: FAX [phone]    "

## 2025-05-27 NOTE — TELEPHONE ENCOUNTER
Please try and get more information. Have not had any recent thyroid ultrasounds through our office. Was she getting at a different clinic?

## 2025-05-29 NOTE — TELEPHONE ENCOUNTER
"Caller: Rashmi Buck \"Patria\"    Relationship: Self    Best call back number: [phone]     What is the medical concern/diagnosis: ENLARGED THYROID NODULES     What specialty or service is being requested: ENDOCRINOLOGY     What is the provider, practice or medical service name: DR GENESIS WARREN    What is the office location: [facility]     What is the office phone number: [phone]    Any additional details: [phone]    "

## 2025-05-29 NOTE — TELEPHONE ENCOUNTER
PATIENT CONTACTED OFFICE AND STATED SHE HAD A BIOPSY IN [place] IN 2016 AND SEEN ENDOCRINOLOGY AT THAT TIME, SHE WAS TOLD IF SHE NEEDED THEM TO CALL AND SCHEDULE. AFTER HAVING SOME CHANGE IN GLAND SIZE SHE TRIED TO SCHEDULE WITH THEIR OFFICE AND WAS ADVISED SHE WOULD NEED A REFERRAL TO THEIR OFFICE FROM HER PCP. DR GENESIS WARREN

## 2025-05-30 ENCOUNTER — OFFICE VISIT (OUTPATIENT)
Dept: FAMILY MEDICINE CLINIC | Facility: CLINIC | Age: 60
End: 2025-05-30
Payer: COMMERCIAL

## 2025-05-30 VITALS
WEIGHT: 191.5 LBS | DIASTOLIC BLOOD PRESSURE: 80 MMHG | RESPIRATION RATE: 14 BRPM | TEMPERATURE: 97.1 F | HEIGHT: 69 IN | BODY MASS INDEX: 28.36 KG/M2 | OXYGEN SATURATION: 100 % | HEART RATE: 74 BPM | SYSTOLIC BLOOD PRESSURE: 110 MMHG

## 2025-05-30 DIAGNOSIS — K61.0 ANAL ABSCESS: Primary | ICD-10-CM

## 2025-05-30 DIAGNOSIS — M94.0 COSTOCHONDRITIS: ICD-10-CM

## 2025-05-30 PROCEDURE — 99214 OFFICE O/P EST MOD 30 MIN: CPT | Performed by: NURSE PRACTITIONER

## 2025-05-30 RX ORDER — SULFAMETHOXAZOLE AND TRIMETHOPRIM 800; 160 MG/1; MG/1
1 TABLET ORAL 2 TIMES DAILY
Qty: 14 TABLET | Refills: 0 | Status: SHIPPED | OUTPATIENT
Start: 2025-05-30 | End: 2025-06-06

## 2025-06-02 ENCOUNTER — TELEPHONE (OUTPATIENT)
Dept: FAMILY MEDICINE CLINIC | Facility: CLINIC | Age: 60
End: 2025-06-02
Payer: COMMERCIAL

## 2025-06-02 DIAGNOSIS — K61.0 ANAL ABSCESS: Primary | ICD-10-CM

## 2025-06-02 RX ORDER — CLINDAMYCIN HYDROCHLORIDE 300 MG/1
300 CAPSULE ORAL 3 TIMES DAILY
Qty: 21 CAPSULE | Refills: 0 | Status: SHIPPED | OUTPATIENT
Start: 2025-06-02 | End: 2025-06-09

## 2025-06-02 NOTE — TELEPHONE ENCOUNTER
PT called and is having an allergic reaction to Bactrim, itchy rash on arms and ankles, she did not take the second dose this morning and is feeling a little better rash is going away on the arms. Last night was having shortness of breath when walking up and down steps, but today she is better since she did not take the medication, pt does not feel the need to go to ER.  PT wants to know if we can change her medication

## 2025-06-02 NOTE — PROGRESS NOTES
Date: 2025   Patient Name: Rashmi Buck  : 1965   MRN: [id number]     Chief Complaint:    Chief Complaint   Patient presents with    bacterial infection with sores near anus       History of Present Illness: Rashmi Buck is a 60 y.o. female who is here today to follow up for HPI     History of Present Illness  The patient presents for evaluation of an adverse reaction to colonoscopy prep and a bacterial infection.    She underwent a colonoscopy on 2025, during which she experienced a severe adverse reaction to the preparation. The prep was a combination of sodium sulfate, potassium sulfate, and magnesium sulfate. The reaction included tachycardia, tremors, abdominal distension, rectal prolapse, nausea, and a severe headache. The prep was discontinued, and she was advised to seek immediate medical attention. The procedure was completed, and she was administered medication for nausea and headache before being discharged. She has informed Dr. Root about her reaction to the prep.    On 2025, she noticed a welt and open sores in her anal region, prompting a visit to the emergency room. She was diagnosed with a bacterial infection and prescribed Bactrim twice daily for 7 days. By , the welt had reduced to a bruise-like appearance. However, she reported pain during a shower today, with the welt appearing larger and more swollen. She reports no fever or drainage from the site. She also reported weakness and excessive sleepiness while on Bactrim. She has been using witch hazel astringent for daily cleaning.    Additionally, she reports experiencing sharp pain when bending over, which she describes as severe enough to cause breathlessness. She typically manages pain with aspirin.       Review of Systems:   Review of Systems   Skin:  Positive for skin lesions (anal region).       I have reviewed the patients family history, social history, past medical history, past  "surgical history and have updated it as appropriate.     Medications:     Current Outpatient Medications:     dextrose (D5W) 5 % solution, Inject 100 mL into the appropriate muscle as directed by prescriber., Disp: , Rfl:     sulfamethoxazole-trimethoprim (Bactrim DS) 800-160 MG per tablet, Take 1 tablet by mouth 2 (Two) Times a Day for 7 days., Disp: 14 tablet, Rfl: 0    Allergies:   Allergies   Allergen Reactions    Other Other (See Comments)     Steroids cause bleeding lesions in stomach per patient     Augmentin [Amoxicillin-Pot Clavulanate] Other (See Comments)     Colitis        PHQ-9 Total Score:      Physical Exam:  Vital Signs:   Vitals:    05/30/25 1624   BP: 110/80   Pulse: 74   Resp: 14   Temp: 97.1 °F (36.2 °C)   SpO2: 100%   Weight: 86.9 kg (191 lb 8 oz)   Height: 175.3 cm (69\")     Body mass index is 28.28 kg/m².           Physical Exam  Vitals and nursing note reviewed.   Constitutional:       Appearance: Normal appearance.   HENT:      Head: Normocephalic and atraumatic.   Cardiovascular:      Rate and Rhythm: Normal rate and regular rhythm.   Pulmonary:      Effort: Pulmonary effort is normal.      Breath sounds: Normal breath sounds.   Genitourinary:     Labia:         Left: Lesion (small abscess is present, mild discoloration, no warmth and no drainage is present) present.        Skin:     General: Skin is warm.   Neurological:      Mental Status: She is alert and oriented to person, place, and time.           Assessment/Plan:   Diagnoses and all orders for this visit:    1. Anal abscess (Primary)  -     sulfamethoxazole-trimethoprim (Bactrim DS) 800-160 MG per tablet; Take 1 tablet by mouth 2 (Two) Times a Day for 7 days.  Dispense: 14 tablet; Refill: 0    2. Costochondritis         Assessment & Plan  1. Adverse reaction to colonoscopy prep.  - Severe reaction to colonoscopy prep included heart racing, shaking, abdominal distension, rectal prolapse, severe nausea, and headache.  - Prep was " discontinued, and she was treated for nausea and headache at the hospital.  - Sodium sulfate, potassium sulfate, and magnesium sulfate should be avoided in the future.  - Information will be added to her file, and Dr. Root has been informed.    2. Bacterial infection.  - Developed a bacterial infection following the colonoscopy, initially treated with Bactrim for 7 days.  - Infection initially improved but has since worsened, with increased swelling and pain.  - Prescription for Bactrim will be extended for an additional 7 days, to be taken with probiotics.  - Advised to use Dial antibacterial soap for cleaning, continue using witch hazel, and apply warm compresses.    3. Costochondritis.  - Reports sharp pain when bending over, likely due to costochondritis from the colonoscopy procedure.  - No contraindications to ibuprofen, recommended for pain management and inflammation control.  - Advised to monitor the condition over time.  - Ibuprofen recommended for pain management and inflammation control.    Patient or patient representative verbalized consent for the use of Ambient Listening during the visit with  LEE Hall for chart documentation. 6/2/2025  09:31 EDT      Follow Up:   Return if symptoms worsen or fail to improve.      Missy Enamorado. LEE   [facility]

## 2025-06-23 ENCOUNTER — HOSPITAL ENCOUNTER (OUTPATIENT)
Dept: ULTRASOUND IMAGING | Facility: HOSPITAL | Age: 60
Discharge: HOME | End: 2025-06-23
Admitting: PHYSICIAN ASSISTANT
Payer: COMMERCIAL

## 2025-06-23 PROCEDURE — 76536 US EXAM OF HEAD AND NECK: CPT

## 2025-06-25 ENCOUNTER — TELEPHONE (OUTPATIENT)
Dept: FAMILY MEDICINE CLINIC | Facility: CLINIC | Age: 60
End: 2025-06-25
Payer: COMMERCIAL

## 2025-06-25 NOTE — TELEPHONE ENCOUNTER
"    Caller: Rashmi Torres \"Patria\"    Relationship to patient: Self    Best call back number: [phone]     Patient is needing: CALL BACK ABOUT ULTRASOUND RESULTS. PATIENT NEEDS BIOPSIES, ENDO CAN NOT GET PATIENT IN FOR 3 MONTHS.         "

## 2025-06-25 NOTE — TELEPHONE ENCOUNTER
"Caller: Rashmi Torres \"Patria\"    Relationship: Self    Best call back number: [phone]    What is the medical concern/diagnosis: THYROID NODULES     What specialty or service is being requested: PARMINDER    What is the provider, practice or medical service name:  [facility]OLOGY [facility]INIC     What is the office phone number: [phone]    Any additional details: [facility] IS NOT ABLE TO GET HER IN FOR ANOTHER 3 MONTHS, BUT PATIENT CALLED  AND THEY WILL BE ABLE TO GET HER IN SOONER IF THERE IS A REFERRAL SENT IN. PLEASE CALL WHEN SENT OVER AND ALSO REQUESTING CLARIFICATION ON IF BIOPSIES WILL BE NEEDED     "

## 2025-06-27 DIAGNOSIS — K61.0 ANAL ABSCESS: Primary | ICD-10-CM

## 2025-06-27 RX ORDER — MUPIROCIN 2 %
1 OINTMENT (GRAM) TOPICAL 3 TIMES DAILY
Qty: 30 G | Refills: 0 | Status: SHIPPED | OUTPATIENT
Start: 2025-06-27

## 2025-06-30 ENCOUNTER — TELEPHONE (OUTPATIENT)
Dept: FAMILY MEDICINE CLINIC | Facility: CLINIC | Age: 60
End: 2025-06-30
Payer: COMMERCIAL

## 2025-06-30 NOTE — TELEPHONE ENCOUNTER
"Caller: Rashmi Torres \"Patria\"    Relationship: Self    Best call back number:       [phone] (Mobile)     What is the best time to reach you:     ANY TIME    Who are you requesting to speak with (clinical staff, provider,  specific staff member):     SAADIA GEE OR SHONNA    What was the call regarding:     PATIENT REQUESTED A CALL BACK REGARDING RECURRING ANAL ABSCESS, INTESTINAL DISCOMFORT INCLUDING FLAT FECES, CONSTIPATION, CRAMPING, GAS    [address] [place], [place]    TELEPHONE CONTACT:    [phone]  "

## 2025-06-30 NOTE — TELEPHONE ENCOUNTER
Yes. He told her it was 'just coincidental and dismissed the issue.' The spot drained but filled back up w/ bld.     Per Missy, pt needs to schedule appt. Pt transferred to front.

## 2025-06-30 NOTE — TELEPHONE ENCOUNTER
Has patient called Dr Root's office. She really needs to follow up with Dr Root as symptoms have been lingering since her colonoscopy in May.

## 2025-07-02 ENCOUNTER — TELEPHONE (OUTPATIENT)
Dept: FAMILY MEDICINE CLINIC | Facility: CLINIC | Age: 60
End: 2025-07-02
Payer: COMMERCIAL

## 2025-07-02 DIAGNOSIS — K61.0 ANAL ABSCESS: Primary | ICD-10-CM

## 2025-07-02 NOTE — TELEPHONE ENCOUNTER
Patria went to [facility] yesterday per Lenny's advise. They advised her that she needs to see a new gastro provider. She would like to possibly see someone at [facility] if possible.     They stated that was not able to find anything that is causing the abscess, she is still having the cramping. When they did a physical exam they stated that they only seen a discoloration area but no abscess, however she has a photo, her  sees it as well. She is not able to feel the abscess when she is laying down but she can feel it when standing.